# Patient Record
Sex: FEMALE | Race: ASIAN | NOT HISPANIC OR LATINO
[De-identification: names, ages, dates, MRNs, and addresses within clinical notes are randomized per-mention and may not be internally consistent; named-entity substitution may affect disease eponyms.]

---

## 2023-09-07 ENCOUNTER — APPOINTMENT (OUTPATIENT)
Dept: OTOLARYNGOLOGY | Facility: CLINIC | Age: 33
End: 2023-09-07

## 2023-09-07 PROBLEM — Z00.00 ENCOUNTER FOR PREVENTIVE HEALTH EXAMINATION: Status: ACTIVE | Noted: 2023-09-07

## 2024-05-27 ENCOUNTER — EMERGENCY (EMERGENCY)
Facility: HOSPITAL | Age: 34
LOS: 1 days | Discharge: HOME | End: 2024-05-27
Attending: STUDENT IN AN ORGANIZED HEALTH CARE EDUCATION/TRAINING PROGRAM | Admitting: STUDENT IN AN ORGANIZED HEALTH CARE EDUCATION/TRAINING PROGRAM
Payer: MEDICAID

## 2024-05-27 VITALS
OXYGEN SATURATION: 100 % | RESPIRATION RATE: 16 BRPM | SYSTOLIC BLOOD PRESSURE: 100 MMHG | HEART RATE: 82 BPM | TEMPERATURE: 98 F | DIASTOLIC BLOOD PRESSURE: 68 MMHG

## 2024-05-27 VITALS
DIASTOLIC BLOOD PRESSURE: 65 MMHG | SYSTOLIC BLOOD PRESSURE: 99 MMHG | RESPIRATION RATE: 18 BRPM | OXYGEN SATURATION: 99 % | TEMPERATURE: 98 F | HEART RATE: 80 BPM

## 2024-05-27 LAB
ALBUMIN SERPL ELPH-MCNC: 4.1 G/DL — SIGNIFICANT CHANGE UP (ref 3.3–5)
ALP SERPL-CCNC: 65 U/L — SIGNIFICANT CHANGE UP (ref 40–120)
ALT FLD-CCNC: 21 U/L — SIGNIFICANT CHANGE UP (ref 4–33)
ANION GAP SERPL CALC-SCNC: 12 MMOL/L — SIGNIFICANT CHANGE UP (ref 7–14)
AST SERPL-CCNC: 38 U/L — HIGH (ref 4–32)
BASOPHILS # BLD AUTO: 0.04 K/UL — SIGNIFICANT CHANGE UP (ref 0–0.2)
BASOPHILS NFR BLD AUTO: 0.4 % — SIGNIFICANT CHANGE UP (ref 0–2)
BILIRUB SERPL-MCNC: 0.3 MG/DL — SIGNIFICANT CHANGE UP (ref 0.2–1.2)
BUN SERPL-MCNC: 11 MG/DL — SIGNIFICANT CHANGE UP (ref 7–23)
CALCIUM SERPL-MCNC: 9.2 MG/DL — SIGNIFICANT CHANGE UP (ref 8.4–10.5)
CHLORIDE SERPL-SCNC: 105 MMOL/L — SIGNIFICANT CHANGE UP (ref 98–107)
CO2 SERPL-SCNC: 21 MMOL/L — LOW (ref 22–31)
CREAT SERPL-MCNC: 0.38 MG/DL — LOW (ref 0.5–1.3)
EGFR: 136 ML/MIN/1.73M2 — SIGNIFICANT CHANGE UP
EOSINOPHIL # BLD AUTO: 0.17 K/UL — SIGNIFICANT CHANGE UP (ref 0–0.5)
EOSINOPHIL NFR BLD AUTO: 1.6 % — SIGNIFICANT CHANGE UP (ref 0–6)
GLUCOSE SERPL-MCNC: 82 MG/DL — SIGNIFICANT CHANGE UP (ref 70–99)
HCT VFR BLD CALC: 35 % — SIGNIFICANT CHANGE UP (ref 34.5–45)
HGB BLD-MCNC: 11.9 G/DL — SIGNIFICANT CHANGE UP (ref 11.5–15.5)
IANC: 6.59 K/UL — SIGNIFICANT CHANGE UP (ref 1.8–7.4)
IMM GRANULOCYTES NFR BLD AUTO: 0.2 % — SIGNIFICANT CHANGE UP (ref 0–0.9)
LYMPHOCYTES # BLD AUTO: 2.93 K/UL — SIGNIFICANT CHANGE UP (ref 1–3.3)
LYMPHOCYTES # BLD AUTO: 28 % — SIGNIFICANT CHANGE UP (ref 13–44)
MAGNESIUM SERPL-MCNC: 2.2 MG/DL — SIGNIFICANT CHANGE UP (ref 1.6–2.6)
MCHC RBC-ENTMCNC: 28.6 PG — SIGNIFICANT CHANGE UP (ref 27–34)
MCHC RBC-ENTMCNC: 34 GM/DL — SIGNIFICANT CHANGE UP (ref 32–36)
MCV RBC AUTO: 84.1 FL — SIGNIFICANT CHANGE UP (ref 80–100)
MONOCYTES # BLD AUTO: 0.72 K/UL — SIGNIFICANT CHANGE UP (ref 0–0.9)
MONOCYTES NFR BLD AUTO: 6.9 % — SIGNIFICANT CHANGE UP (ref 2–14)
NEUTROPHILS # BLD AUTO: 6.59 K/UL — SIGNIFICANT CHANGE UP (ref 1.8–7.4)
NEUTROPHILS NFR BLD AUTO: 62.9 % — SIGNIFICANT CHANGE UP (ref 43–77)
NRBC # BLD: 0 /100 WBCS — SIGNIFICANT CHANGE UP (ref 0–0)
NRBC # FLD: 0 K/UL — SIGNIFICANT CHANGE UP (ref 0–0)
NT-PROBNP SERPL-SCNC: 84 PG/ML — SIGNIFICANT CHANGE UP
PLATELET # BLD AUTO: 147 K/UL — LOW (ref 150–400)
POTASSIUM SERPL-MCNC: 5.2 MMOL/L — SIGNIFICANT CHANGE UP (ref 3.5–5.3)
POTASSIUM SERPL-SCNC: 5.2 MMOL/L — SIGNIFICANT CHANGE UP (ref 3.5–5.3)
PROT SERPL-MCNC: 7.7 G/DL — SIGNIFICANT CHANGE UP (ref 6–8.3)
RBC # BLD: 4.16 M/UL — SIGNIFICANT CHANGE UP (ref 3.8–5.2)
RBC # FLD: 14.6 % — HIGH (ref 10.3–14.5)
SODIUM SERPL-SCNC: 138 MMOL/L — SIGNIFICANT CHANGE UP (ref 135–145)
TROPONIN T, HIGH SENSITIVITY RESULT: <6 NG/L — SIGNIFICANT CHANGE UP
WBC # BLD: 10.47 K/UL — SIGNIFICANT CHANGE UP (ref 3.8–10.5)
WBC # FLD AUTO: 10.47 K/UL — SIGNIFICANT CHANGE UP (ref 3.8–10.5)

## 2024-05-27 PROCEDURE — 99284 EMERGENCY DEPT VISIT MOD MDM: CPT

## 2024-05-27 PROCEDURE — 93010 ELECTROCARDIOGRAM REPORT: CPT

## 2024-05-27 PROCEDURE — 71045 X-RAY EXAM CHEST 1 VIEW: CPT | Mod: 26

## 2024-05-27 RX ORDER — ASPIRIN/CALCIUM CARB/MAGNESIUM 324 MG
162 TABLET ORAL ONCE
Refills: 0 | Status: COMPLETED | OUTPATIENT
Start: 2024-05-27 | End: 2024-05-27

## 2024-05-27 RX ORDER — FUROSEMIDE 40 MG
40 TABLET ORAL ONCE
Refills: 0 | Status: COMPLETED | OUTPATIENT
Start: 2024-05-27 | End: 2024-05-27

## 2024-05-27 RX ADMIN — Medication 162 MILLIGRAM(S): at 15:35

## 2024-05-27 RX ADMIN — Medication 40 MILLIGRAM(S): at 15:35

## 2024-05-27 NOTE — ED ADULT TRIAGE NOTE - LOCATION:
----- Message from Marilu Reyna sent at 11/7/2017 10:17 AM CST -----  Contact: pt      _  1st Request  _  2nd Request  _  3rd Request    Please refill the medication(s) listed below. Please call the patient when the prescription(s) is ready for  at this phone number      [phone]      Medication #1hydrOXYzine HCl (ATARAX) 25 MG tablet     Medication #2      Preferred Pharmacy:[facility] [phone]    
Right arm;

## 2024-05-27 NOTE — ED ADULT TRIAGE NOTE - CHIEF COMPLAINT QUOTE
Patient has c/o left underarm pain for the past three days radiating to her chest when she puts her am up. She also states that she feels weak and dizzy.

## 2024-05-27 NOTE — ED ADULT NURSE NOTE - OBJECTIVE STATEMENT
Pt arrives to intake. Pt is A and OX4 and ambulatory. Pt arrives to the ED complaining of left arm pain and chest pain for the past 3 days Pt states that the pain is constant. Pt states that her left breast area is also more swollen than usual. Airway is patent, respirations are even and unlabored. IV placed in the LAC. Pt medicated and labs sent per provider orders. Plan of care ongoing, safety maintained.

## 2024-05-27 NOTE — ED PROVIDER NOTE - PATIENT PORTAL LINK FT
You can access the FollowMyHealth Patient Portal offered by [facility] by registering at the following website: [email or web address]. By joining [email or web address]’s FollowMyHealth portal, you will also be able to view your health information using other applications (apps) compatible with our system.

## 2024-05-27 NOTE — ED PROVIDER NOTE - NSFOLLOWUPINSTRUCTIONS_ED_ALL_ED_FT
Chest Pain    WHAT YOU NEED TO KNOW:    Chest pain can be caused by a range of conditions, from not serious to life-threatening. Chest pain can be a symptom of a digestive problem, such as acid reflux or a stomach ulcer. An anxiety attack or a strong emotion, such as anger, can also cause chest pain. Infection, inflammation, or a fracture in the bones or cartilage in your chest can cause pain or discomfort. Sometimes chest pain or pressure is caused by poor blood flow to your heart (angina). Chest pain may also be caused by life-threatening conditions such as a heart attack or blood clot in your lungs.    DISCHARGE INSTRUCTIONS:    Call your local emergency number (911 in the [place]) or have someone call if:    You have any of the following signs of a heart attack:  Squeezing, pressure, or pain in your chest    You may also have any of the following:  Discomfort or pain in your back, neck, jaw, stomach, or arm    Shortness of breath    Nausea or vomiting    Lightheadedness or a sudden cold sweat    Seek care immediately if:    You have chest discomfort that gets worse, even with medicine.    You cough or vomit blood.    Your bowel movements are black or bloody.    You cannot stop vomiting, or it hurts to swallow.  Call your doctor if:    You have questions or concerns about your condition or care.    Medicines:    Medicines may be given to treat the cause of your chest pain. Examples include pain medicine, anxiety medicine, or medicines to increase blood flow to your heart.    Do not take certain medicines without asking your healthcare provider first. These include NSAIDs, herbal or vitamin supplements, or hormones (estrogen or progestin).    Take your medicine as directed. Contact your healthcare provider if you think your medicine is not helping or if you have side effects. Tell him or her if you are allergic to any medicine. Keep a list of the medicines, vitamins, and herbs you take. Include the amounts, and when and why you take them. Bring the list or the pill bottles to follow-up visits. Carry your medicine list with you in case of an emergency.  Healthy living tips: The following are general healthy guidelines. If the cause of your chest pain is known, your healthcare provider will give you specific guidelines to follow.    Do not smoke. Nicotine and other chemicals in cigarettes and cigars can cause lung and heart damage. Ask your healthcare provider for information if you currently smoke and need help to quit. E-cigarettes or smokeless tobacco still contain nicotine. Talk to your healthcare provider before you use these products.    Choose a variety of healthy foods as often as possible. Include fresh, frozen, or canned fruits and vegetables. Also include low-fat dairy products, fish, chicken (without skin), and lean meats. Your healthcare provider or a dietitian can help you create meal plans. You may need to avoid certain foods or drinks if your pain is caused by a digestion problem.  Healthy Foods      Lower your sodium (salt) intake. Limit foods that are high in sodium, such as canned foods, salty snacks, and cold cuts. If you add salt when you cook food, do not add more at the table. Choose low-sodium canned foods as much as possible.        Drink plenty of water every day. Water helps your body to control temperature and blood pressure. Ask your healthcare provider how much water you should drink every day.    Ask about activity. Your healthcare provider will tell you which activities to limit or avoid. Ask when you can drive, return to work, and have sex. Ask about the best exercise plan for you.    Maintain a healthy weight. Ask your healthcare provider what a healthy weight is for you. Ask him or her to help you create a safe weight loss plan if you are overweight.    Ask about vaccines you may need. Get the influenza (flu) vaccine every year as soon as recommended, usually in September or October. You may also need a pneumococcal vaccine to prevent pneumonia. The vaccine is usually given every 5 years, starting at age 65. Your healthcare provider can tell you if should get other vaccines, and when to get them.  Follow up with your healthcare provider within 72 hours, or as directed: You may need to return for more tests to find the cause of your chest pain. You may be referred to a specialist, such as a cardiologist or gastroenterologist. Write down your questions so you remember to ask them during your visits.

## 2024-05-27 NOTE — ED ADULT NURSE NOTE - NSFALLUNIVINTERV_ED_ALL_ED
Bed/Stretcher in lowest position, wheels locked, appropriate side rails in place/Call bell, personal items and telephone in reach/Instruct patient to call for assistance before getting out of bed/chair/stretcher/Non-slip footwear applied when patient is off stretcher/[place] to call system/Physically safe environment - no spills, clutter or unnecessary equipment/Purposeful proactive rounding/Room/bathroom lighting operational, light cord in reach

## 2024-05-27 NOTE — ED PROVIDER NOTE - CLINICAL SUMMARY MEDICAL DECISION MAKING FREE TEXT BOX
34-year-old female no stated past medical history is presenting to the emergency department with left-sided breast pain.  Says it is in her chest and radiates to the left arm.  It is constant.  Nonexertional.  Has seen a cardiologist years ago with a negative workup.  Patient has no shortness of breath, no fever no vomiting no diarrhea.  Vital signs reviewed and are within normal limits.  Chaperoned exam shows a normal left breast without any rash.  She does have some reproducible chest wall tenderness in the mid left thoracic chest.  Abdomen soft nontender nondistended.  Will obtain basic screening labs and ACS workup.  Patient is PERC negative.  Patient to be signed out to oncoming attending pending workup with likely eventual discharge. 34-year-old female no stated past medical history is presenting to the emergency department with left-sided breast pain.  Says it is in her chest and radiates to the left arm.  It is constant.  Nonexertional.  Has seen a cardiologist years ago with a negative workup.  Patient has no shortness of breath, no fever no vomiting no diarrhea.  Vital signs reviewed and are within normal limits.  Chaperoned exam shows a normal left breast without any rash.  She does have some reproducible chest wall tenderness in the mid left thoracic chest.  Abdomen soft nontender nondistended.  Will obtain basic screening labs and ACS workup.  Patient is PERC negative.        ===================================  update (Bart Concepcion MD; attending emergency medicine and medical toxicology)    Workup negative.  Patient wants to follow-up with her private cardiologist.  Return precautions reviewed

## 2024-05-27 NOTE — ED ADULT TRIAGE NOTE - MODE OF ARRIVAL
My top 10 tips to keep eyes healthy and comfortable:     1. Wear sunglasses with 100% UV protection and blue blocking lenses to lessen the risk or progression of cataracts, macular degeneration, and eyelid skin cancers.  2. Consider a clear 100% UV coating and an antireflective coating on prescription eyeglasses.  3. Consider impact-resistant Trivex or polycarbonate lenses for prescription eyeglasses for safety.  4. Position computer screens greater than 24 inches away and at a pitch of 20-30° below eye level. Bright room and screen lighting improves focusing ability by constricting the pupils of the eyes.  5. Avoid \"get the red out\" eyedrops (Visine, Murine, Clear Eyes, etc). Instead use an artificial tear drop (Systane Complete Lubricant Eye Drops, Systane Ultra Lubricant Eye Drops, Refresh Optive Eye Drops, GenTeal Mild Liquid Drops) for dryness and irritation or an antihistamine drop (Alaway or Zaditor (ketotifen fumarate ophthalmic solution 0.035%)) for allergy symptoms.  6. Avoid smoking - smoking increases the risk of cataracts and macular degeneration.  7. Avoid eye rubbing - rubbing increases the risk of keratoconus (a corneal degeneration).  8. Take a multivitamin and 6 mg of lutein daily (lutein is found in kale and spinach and helps strengthen the pigment layer of the retina protecting against macular degeneration and may also help with light sensitivity).  9. Proper diet and exercise to lower weight, blood pressure, cholesterol, and blood sugar which lessens the risk of retinal disorders.   10. Have a dilated eye examination as directed by our office. The frequency is based on current eye health, age, family history, and risk factors. Many eye and general health problems are symptomless and are uncovered by eye exams. Dilation is an extremely important part of the exam. Without dilation, detection of eye problems (retinal bleeds/strokes, tears, detachments, degenerations, and melanoma) and signs of an  underlying health problem (diabetes, hypertension, autoimmune disease, leukemia, lymphoma, blockage of a carotid or cardiac artery, toxoplasmosis, histoplasmosis, and many others) is impossible. If these conditions are undetected and untreated, some can lead to blindness or even death. In our office, dilation is mandatory to provide the best care possible.    Thank you for choosing and trusting our office with your health.     Walk in

## 2024-09-17 NOTE — ED PROVIDER NOTE - CHIEF COMPLAINT
The patient is a 34y Female complaining of chest pain.
Photo Preface (Leave Blank If You Do Not Want): Photographs were obtained today
Detail Level: Zone

## 2024-11-09 ENCOUNTER — EMERGENCY (EMERGENCY)
Facility: HOSPITAL | Age: 34
LOS: 1 days | Discharge: HOME | End: 2024-11-09
Attending: EMERGENCY MEDICINE | Admitting: EMERGENCY MEDICINE
Payer: MEDICAID

## 2024-11-09 VITALS
HEART RATE: 93 BPM | DIASTOLIC BLOOD PRESSURE: 69 MMHG | SYSTOLIC BLOOD PRESSURE: 106 MMHG | OXYGEN SATURATION: 99 % | TEMPERATURE: 98 F | RESPIRATION RATE: 16 BRPM | WEIGHT: 151.9 LBS

## 2024-11-09 VITALS
OXYGEN SATURATION: 100 % | HEART RATE: 79 BPM | RESPIRATION RATE: 18 BRPM | SYSTOLIC BLOOD PRESSURE: 99 MMHG | DIASTOLIC BLOOD PRESSURE: 68 MMHG

## 2024-11-09 PROCEDURE — 99284 EMERGENCY DEPT VISIT MOD MDM: CPT

## 2024-11-09 PROCEDURE — 71046 X-RAY EXAM CHEST 2 VIEWS: CPT | Mod: 26

## 2024-11-09 PROCEDURE — 93010 ELECTROCARDIOGRAM REPORT: CPT

## 2024-11-09 RX ORDER — ALBUTEROL 90 MCG
1 AEROSOL (GRAM) INHALATION ONCE
Refills: 0 | Status: COMPLETED | OUTPATIENT
Start: 2024-11-09 | End: 2024-11-09

## 2024-11-09 RX ORDER — ALBUTEROL 90 MCG
2.5 AEROSOL (GRAM) INHALATION ONCE
Refills: 0 | Status: COMPLETED | OUTPATIENT
Start: 2024-11-09 | End: 2024-11-09

## 2024-11-09 RX ADMIN — Medication 2.5 MILLIGRAM(S): at 11:54

## 2024-11-09 RX ADMIN — Medication 1 PUFF(S): at 12:33

## 2024-11-09 NOTE — ED ADULT TRIAGE NOTE - CHIEF COMPLAINT QUOTE
Pt presents to ED ambulatory from home with c/o fever x 1 week with associated nasal congestion with no improvement from over the counter medications. Pt endorses chest pain worse on deep inspiration x 2 days. Pt denies past medical hx.

## 2024-11-09 NOTE — ED ADULT NURSE NOTE - OBJECTIVE STATEMENT
A&Ox4. ambulatory. c/o SOB, nasal congestion,  and body aches for 1 weeks. NAD. pt denies chest pain, dizziness, weakness, urinary symptoms, HA, n/v/d, fevers, chills, pain. respirations are even and un labored. skin intact. safety precautions maintained.

## 2024-11-09 NOTE — ED PROVIDER NOTE - CARE PLAN
Principal Discharge DX:	Chest pain  Secondary Diagnosis:	Viral URI  Secondary Diagnosis:	RAD (reactive airway disease)   1

## 2024-11-09 NOTE — ED PROVIDER NOTE - PROGRESS NOTE DETAILS
Elle: Repeat exam after neb improved air entry but still dec. R sided b.s. CXR neg for PNA. Will give albuterol MDI w spacer and RX for prednisone course.

## 2024-11-09 NOTE — ED PROVIDER NOTE - SECONDARY DIAGNOSIS.
Yes.  Also could be related to her recent issues with diarrhea as well. MAT   Viral URI RAD (reactive airway disease)

## 2024-11-09 NOTE — ED PROVIDER NOTE - PATIENT PORTAL LINK FT
You can access the FollowMyHealth Patient Portal offered by [facility] by registering at the following website: [email or web address]. By joining [employer]’s FollowMyHealth portal, you will also be able to view your health information using other applications (apps) compatible with our system.

## 2024-11-09 NOTE — ED PROVIDER NOTE - OBJECTIVE STATEMENT
34F complaining of chest pain.  Patient has had 1 to 2 weeks of URI symptoms, initially with fever, cough, phlegm.  Patient had sore throat which lasted for several days but is now resolved.  This week patient continued to have dry cough especially at night.  For past 3 days has increased chest tightness and heaviness, increased SOB with activity, and pleuritic chest pain.  No leg complaints, no travel/trauma/hormone use.  Patient improved during daytime activity but feels he is worse at bedtime.  No history of fall or injury.  States had pneumonia as a child and required hospitalization.  Was given inhalers in the past for similar shortness of breath but not for many years.  No associated N/V/diaphoresis.

## 2024-11-09 NOTE — ED PROVIDER NOTE - CLINICAL SUMMARY MEDICAL DECISION MAKING FREE TEXT BOX
34F complaining of pleuritic chest pain associated with cough and SOB, worse in the past 3 days following 1 to 2-week history of URI symptoms and coughing.  Shallow respiration in ED reproducing pain and causing cough with deep breath.  No wheezing, diminished breath sounds on right side.  Chest x-ray, rule out pneumonia.  Trial of albuterol.  Possible antibiotics if has pneumonia but likely educate to albuterol with spacer.

## 2024-11-09 NOTE — ED PROVIDER NOTE - NSFOLLOWUPINSTRUCTIONS_ED_ALL_ED_FT
You were evaluated in the emergency department for chest pain and shortness of breath associated with cough and recent illness.  Your exam and chest x-ray did not suggest a dangerous cause of your symptoms.  You likely have a viral upper respiratory infection.  This may be causing worsening chest symptoms and shortness of breath with reactive airway disease.  This can be treated with albuterol.  Albuterol 2 puffs, every 4 hours as needed for cough, chest pain, or shortness of breath.  Use spacer as directed.  Prednisone 50 mg once daily for 5 days.  You can continue to use cough medication containing dextromethorphan.  Follow-up with your regular medical doctor.  Return to emergency for worsening pain, fever, weakness, numbness, shortness of breath, nausea, vomiting, dizziness, fainting or any signs of distress.

## 2024-11-14 ENCOUNTER — EMERGENCY (EMERGENCY)
Facility: HOSPITAL | Age: 34
LOS: 1 days | Discharge: HOME | End: 2024-11-14
Admitting: STUDENT IN AN ORGANIZED HEALTH CARE EDUCATION/TRAINING PROGRAM
Payer: MEDICAID

## 2024-11-14 VITALS
OXYGEN SATURATION: 100 % | TEMPERATURE: 99 F | SYSTOLIC BLOOD PRESSURE: 96 MMHG | WEIGHT: 149.91 LBS | DIASTOLIC BLOOD PRESSURE: 69 MMHG | HEART RATE: 74 BPM | RESPIRATION RATE: 18 BRPM

## 2024-11-14 LAB
FLUAV AG NPH QL: SIGNIFICANT CHANGE UP
FLUBV AG NPH QL: SIGNIFICANT CHANGE UP
RSV RNA NPH QL NAA+NON-PROBE: SIGNIFICANT CHANGE UP
SARS-COV-2 RNA SPEC QL NAA+PROBE: SIGNIFICANT CHANGE UP

## 2024-11-14 PROCEDURE — 99284 EMERGENCY DEPT VISIT MOD MDM: CPT

## 2024-11-14 RX ORDER — IBUPROFEN 200 MG
400 TABLET ORAL ONCE
Refills: 0 | Status: COMPLETED | OUTPATIENT
Start: 2024-11-14 | End: 2024-11-14

## 2024-11-14 RX ORDER — AMOXICILLIN AND CLAVULANATE POTASSIUM 600; 42.9 MG/5ML; MG/5ML
1 POWDER, FOR SUSPENSION ORAL ONCE
Refills: 0 | Status: COMPLETED | OUTPATIENT
Start: 2024-11-14 | End: 2024-11-14

## 2024-11-14 RX ORDER — PSEUDOEPHEDRINE HCL 30 MG
30 TABLET ORAL ONCE
Refills: 0 | Status: COMPLETED | OUTPATIENT
Start: 2024-11-14 | End: 2024-11-14

## 2024-11-14 RX ORDER — AMOXICILLIN AND CLAVULANATE POTASSIUM 600; 42.9 MG/5ML; MG/5ML
875 POWDER, FOR SUSPENSION ORAL
Qty: 14 | Refills: 0
Start: 2024-11-14 | End: 2024-11-20

## 2024-11-14 RX ADMIN — Medication 30 MILLIGRAM(S): at 20:31

## 2024-11-14 RX ADMIN — Medication 400 MILLIGRAM(S): at 20:31

## 2024-11-14 RX ADMIN — AMOXICILLIN AND CLAVULANATE POTASSIUM 1 TABLET(S): 600; 42.9 POWDER, FOR SUSPENSION ORAL at 20:45

## 2024-11-14 NOTE — ED PROVIDER NOTE - NSFOLLOWUPINSTRUCTIONS_ED_ALL_ED_FT
You were seen in the emergency room. You were diagnosed with sinusitis. You were prescribed Augmentin antibiotic please take one tablet by mouth twice daily (morning and night) with food for 7 days. Continue Naproxen and Tylenol as directed as needed. Return to ER if any new or worsening symptoms.    Sinusitis    WHAT YOU NEED TO KNOW:    Sinusitis is inflammation or infection of your sinuses. Sinusitis is most often caused by a virus. Acute sinusitis may last up to 12 weeks. Chronic sinusitis lasts longer than 12 weeks. Recurrent sinusitis means you have 4 or more infections in 1 year.  Sinuses    DISCHARGE INSTRUCTIONS:    Return to the emergency department if:    You have trouble breathing or wheezing that is getting worse.    You have a stiff neck, a fever, or a bad headache.    You cannot open your eye.    Your eyeball bulges out or you cannot move your eye.    You are more sleepy than normal, or you notice changes in your ability to think, move, or talk.    You have swelling of your forehead or scalp.  Call your doctor if:    You have vision changes, such as double vision.    Your eye and eyelid are red, swollen, and painful.    Your symptoms do not improve or go away after 10 days.    You have nausea and are vomiting.    Your nose is bleeding.    You have questions or concerns about your condition or care.  Medicines: Your symptoms may go away on their own. Your healthcare provider may recommend watchful waiting for up to 10 days before starting antibiotics. You may need any of the following:    Acetaminophen decreases pain and fever. It is available without a doctor's order. Ask how much to take and how often to take it. Follow directions. Read the labels of all other medicines you are using to see if they also contain acetaminophen, or ask your doctor or pharmacist. Acetaminophen can cause liver damage if not taken correctly.    NSAIDs, such as ibuprofen, help decrease swelling, pain, and fever. This medicine is available with or without a doctor's order. NSAIDs can cause stomach bleeding or kidney problems in certain people. If you take blood thinner medicine, always ask your healthcare provider if NSAIDs are safe for you. Always read the medicine label and follow directions.    Nasal steroid sprays may help decrease inflammation in your nose and sinuses.    Decongestants help reduce swelling and drain mucus in the nose and sinuses. They may help you breathe easier.    Antihistamines help dry mucus in the nose and relieve sneezing.    Antibiotics help treat or prevent a bacterial infection.    Take your medicine as directed. Contact your healthcare provider if you think your medicine is not helping or if you have side effects. Tell your provider if you are allergic to any medicine. Keep a list of the medicines, vitamins, and herbs you take. Include the amounts, and when and why you take them. Bring the list or the pill bottles to follow-up visits. Carry your medicine list with you in case of an emergency.  Self-care:    Rinse your sinuses as directed. Use a sinus rinse device to rinse your nasal passages with a saline (salt water) solution or distilled water. Do not use tap water. This will help thin the mucus in your nose and rinse away pollen and dirt. It will also help reduce swelling so you can breathe normally.    Use a humidifier to increase air moisture in your home. This may make it easier for you to breathe and help decrease your cough.  Humidifier      Sleep with your head elevated. Place an extra pillow under your head before you go to sleep to help your sinuses drain.  Elevate Head (Adult)      Drink liquids as directed. Ask your healthcare provider how much liquid to drink each day and which liquids are best for you. Liquids will thin the mucus in your nose and help it drain. Avoid drinks that contain alcohol or caffeine.    Do not smoke, and avoid secondhand smoke. Nicotine and other chemicals in cigarettes and cigars can make your symptoms worse. Ask your healthcare provider for information if you currently smoke and need help to quit. E-cigarettes or smokeless tobacco still contain nicotine. Talk to your healthcare provider before you use these products.  Prevent the spread of germs:    Wash your hands often with soap and water. Wash your hands after you use the bathroom, change a child's diaper, or sneeze. Wash your hands before you prepare or eat food.  Handwashing      Stay away from people who are sick. Some germs spread easily and quickly through contact.  Follow up with your doctor as directed: You may be referred to an ear, nose, and throat specialist. Write down your questions so you remember to ask them during your visits.

## 2024-11-14 NOTE — ED PROVIDER NOTE - PATIENT PORTAL LINK FT
You can access the FollowMyHealth Patient Portal offered by [facility] by registering at the following website: [email or web address]. By joining [employer]’s FollowMyHealth portal, you will also be able to view your health information using other applications (apps) compatible with our system.

## 2024-11-14 NOTE — ED ADULT NURSE NOTE - OBJECTIVE STATEMENT
Pt received to intake room 5, A&Ox4, ambulatory. Pt presenting with flu like symptoms for the last 3 days. Pt endorsing sinus congestion and pain with headache. Pt denies c/p, SOB, abd pain, n/v/d, or blurry vision. Pt medicated as per MD orders. Respirations even and unlabored, NAD noted. Lab drawn and sent. Comfort measures provided, safety maintained.

## 2024-11-14 NOTE — ED PROVIDER NOTE - OBJECTIVE STATEMENT
Pt is a 33 YO F with no significant PMH who presented to ED with 3 days of headache, sinus pressure and pain to her left upper teeth. Pt reports she has had congestion, chills, fatigue for ~ 1.5 weeks now. Pt reports she has been taking Tylenol and Naproxen every few hours with some relief. Pt otherwise denies chest pain, palpitations, nausea, vomiting, syncope, rash, neck pain, vision changes.

## 2024-11-14 NOTE — ED PROVIDER NOTE - CLINICAL SUMMARY MEDICAL DECISION MAKING FREE TEXT BOX
Pt is a 33 YO F with no significant PMH who presented to ED with 3 days of headache, sinus pressure and pain to her left upper teeth. Pt exam consistent with sinusitis. A febrile, non meningitic, lungs CTA bilaterally.  Offered pt blood work, IV fluids, IV medications however pt prefers oral medication and does not want to be stuck with a needle. plan for viral swab and will tx with antibiotics for sinusitis, strict return precautions discussed.

## 2024-11-14 NOTE — ED ADULT NURSE NOTE - NSFALLUNIVINTERV_ED_ALL_ED
Bed/Stretcher in lowest position, wheels locked, appropriate side rails in place/Call bell, personal items and telephone in reach/Instruct patient to call for assistance before getting out of bed/chair/stretcher/Non-slip footwear applied when patient is off stretcher/[place] to call system/Physically safe environment - no spills, clutter or unnecessary equipment/Purposeful proactive rounding/Room/bathroom lighting operational, light cord in reach

## 2024-11-15 PROBLEM — Z78.9 OTHER SPECIFIED HEALTH STATUS: Chronic | Status: ACTIVE | Noted: 2024-11-09

## 2024-11-19 NOTE — ED ADULT NURSE NOTE - NSFALLRISKASMTTYPE_ED_ALL_ED
Lab Results   Component Value Date    HGBA1C 9.9 (H) 11/10/2024       Recent Labs     11/18/24  1530 11/18/24  2052 11/19/24  0752 11/19/24  1110   POCGLU 155* 226* 136 140     Patient on lantus 5 U BID at home   Cw lantus BID, SSI , accuchecks titrate as needed   Lantus 12 U, BID  Lispro 8 U, TID w meals  ISS  Mgmt per IM    Initial (On Arrival)

## 2025-01-30 ENCOUNTER — EMERGENCY (EMERGENCY)
Facility: HOSPITAL | Age: 35
LOS: 1 days | Discharge: HOME | End: 2025-01-30
Attending: EMERGENCY MEDICINE | Admitting: EMERGENCY MEDICINE
Payer: MEDICAID

## 2025-01-30 VITALS
OXYGEN SATURATION: 100 % | RESPIRATION RATE: 18 BRPM | DIASTOLIC BLOOD PRESSURE: 54 MMHG | HEIGHT: 64 IN | HEART RATE: 82 BPM | TEMPERATURE: 98 F | SYSTOLIC BLOOD PRESSURE: 98 MMHG | WEIGHT: 134.92 LBS

## 2025-01-30 VITALS — SYSTOLIC BLOOD PRESSURE: 101 MMHG | HEART RATE: 73 BPM | DIASTOLIC BLOOD PRESSURE: 72 MMHG

## 2025-01-30 LAB
ALBUMIN SERPL ELPH-MCNC: 4 G/DL — SIGNIFICANT CHANGE UP (ref 3.3–5)
ALP SERPL-CCNC: 62 U/L — SIGNIFICANT CHANGE UP (ref 40–120)
ALT FLD-CCNC: 11 U/L — SIGNIFICANT CHANGE UP (ref 4–33)
ANION GAP SERPL CALC-SCNC: 13 MMOL/L — SIGNIFICANT CHANGE UP (ref 7–14)
AST SERPL-CCNC: 20 U/L — SIGNIFICANT CHANGE UP (ref 4–32)
BASOPHILS # BLD AUTO: 0.04 K/UL — SIGNIFICANT CHANGE UP (ref 0–0.2)
BASOPHILS NFR BLD AUTO: 0.4 % — SIGNIFICANT CHANGE UP (ref 0–2)
BILIRUB SERPL-MCNC: 0.3 MG/DL — SIGNIFICANT CHANGE UP (ref 0.2–1.2)
BUN SERPL-MCNC: 11 MG/DL — SIGNIFICANT CHANGE UP (ref 7–23)
CALCIUM SERPL-MCNC: 9.5 MG/DL — SIGNIFICANT CHANGE UP (ref 8.4–10.5)
CHLORIDE SERPL-SCNC: 104 MMOL/L — SIGNIFICANT CHANGE UP (ref 98–107)
CO2 SERPL-SCNC: 21 MMOL/L — LOW (ref 22–31)
CREAT SERPL-MCNC: 0.33 MG/DL — LOW (ref 0.5–1.3)
EGFR: 139 ML/MIN/1.73M2 — SIGNIFICANT CHANGE UP
EGFR: 139 ML/MIN/1.73M2 — SIGNIFICANT CHANGE UP
EOSINOPHIL # BLD AUTO: 0.1 K/UL — SIGNIFICANT CHANGE UP (ref 0–0.5)
EOSINOPHIL NFR BLD AUTO: 1 % — SIGNIFICANT CHANGE UP (ref 0–6)
FLUAV AG NPH QL: SIGNIFICANT CHANGE UP
FLUBV AG NPH QL: SIGNIFICANT CHANGE UP
GLUCOSE SERPL-MCNC: 87 MG/DL — SIGNIFICANT CHANGE UP (ref 70–99)
HCG SERPL-ACNC: <1 MIU/ML — SIGNIFICANT CHANGE UP
HCT VFR BLD CALC: 35.3 % — SIGNIFICANT CHANGE UP (ref 34.5–45)
HETEROPH AB TITR SER AGGL: NEGATIVE — SIGNIFICANT CHANGE UP
HGB BLD-MCNC: 11.3 G/DL — LOW (ref 11.5–15.5)
IANC: 7.84 K/UL — HIGH (ref 1.8–7.4)
IMM GRANULOCYTES NFR BLD AUTO: 0.3 % — SIGNIFICANT CHANGE UP (ref 0–0.9)
LYMPHOCYTES # BLD AUTO: 1.69 K/UL — SIGNIFICANT CHANGE UP (ref 1–3.3)
LYMPHOCYTES # BLD AUTO: 16.1 % — SIGNIFICANT CHANGE UP (ref 13–44)
MCHC RBC-ENTMCNC: 28.8 PG — SIGNIFICANT CHANGE UP (ref 27–34)
MCHC RBC-ENTMCNC: 32 G/DL — SIGNIFICANT CHANGE UP (ref 32–36)
MCV RBC AUTO: 90.1 FL — SIGNIFICANT CHANGE UP (ref 80–100)
MONOCYTES # BLD AUTO: 0.79 K/UL — SIGNIFICANT CHANGE UP (ref 0–0.9)
MONOCYTES NFR BLD AUTO: 7.5 % — SIGNIFICANT CHANGE UP (ref 2–14)
NEUTROPHILS # BLD AUTO: 7.84 K/UL — HIGH (ref 1.8–7.4)
NEUTROPHILS NFR BLD AUTO: 74.7 % — SIGNIFICANT CHANGE UP (ref 43–77)
NRBC # BLD AUTO: 0 K/UL — SIGNIFICANT CHANGE UP (ref 0–0)
NRBC # BLD: 0 /100 WBCS — SIGNIFICANT CHANGE UP (ref 0–0)
NRBC # FLD: 0 K/UL — SIGNIFICANT CHANGE UP (ref 0–0)
NRBC BLD-RTO: 0 /100 WBCS — SIGNIFICANT CHANGE UP (ref 0–0)
PLATELET # BLD AUTO: 99 K/UL — LOW (ref 150–400)
POTASSIUM SERPL-MCNC: 4.1 MMOL/L — SIGNIFICANT CHANGE UP (ref 3.5–5.3)
POTASSIUM SERPL-SCNC: 4.1 MMOL/L — SIGNIFICANT CHANGE UP (ref 3.5–5.3)
PROT SERPL-MCNC: 7.4 G/DL — SIGNIFICANT CHANGE UP (ref 6–8.3)
RBC # BLD: 3.92 M/UL — SIGNIFICANT CHANGE UP (ref 3.8–5.2)
RBC # FLD: 13.2 % — SIGNIFICANT CHANGE UP (ref 10.3–14.5)
RSV RNA NPH QL NAA+NON-PROBE: SIGNIFICANT CHANGE UP
SARS-COV-2 RNA SPEC QL NAA+PROBE: SIGNIFICANT CHANGE UP
SODIUM SERPL-SCNC: 138 MMOL/L — SIGNIFICANT CHANGE UP (ref 135–145)
WBC # BLD: 10.49 K/UL — SIGNIFICANT CHANGE UP (ref 3.8–10.5)
WBC # FLD AUTO: 10.49 K/UL — SIGNIFICANT CHANGE UP (ref 3.8–10.5)

## 2025-01-30 PROCEDURE — 99284 EMERGENCY DEPT VISIT MOD MDM: CPT

## 2025-01-30 PROCEDURE — 93010 ELECTROCARDIOGRAM REPORT: CPT

## 2025-01-30 RX ORDER — IBUPROFEN 200 MG
400 TABLET ORAL ONCE
Refills: 0 | Status: DISCONTINUED | OUTPATIENT
Start: 2025-01-30 | End: 2025-01-30

## 2025-01-30 RX ORDER — DEXAMETHASONE 0.5 MG/1
8 TABLET ORAL ONCE
Refills: 0 | Status: DISCONTINUED | OUTPATIENT
Start: 2025-01-30 | End: 2025-01-30

## 2025-01-30 RX ORDER — AMOXICILLIN 500 MG/1
500 CAPSULE ORAL ONCE
Refills: 0 | Status: COMPLETED | OUTPATIENT
Start: 2025-01-30 | End: 2025-01-30

## 2025-01-30 RX ORDER — AMOXICILLIN 500 MG/1
500 CAPSULE ORAL ONCE
Refills: 0 | Status: DISCONTINUED | OUTPATIENT
Start: 2025-01-30 | End: 2025-01-30

## 2025-01-30 RX ORDER — DEXAMETHASONE 0.5 MG/1
10 TABLET ORAL ONCE
Refills: 0 | Status: COMPLETED | OUTPATIENT
Start: 2025-01-30 | End: 2025-01-30

## 2025-01-30 RX ORDER — KETOROLAC TROMETHAMINE 30 MG/ML
15 INJECTION, SOLUTION INTRAMUSCULAR; INTRAVENOUS ONCE
Refills: 0 | Status: DISCONTINUED | OUTPATIENT
Start: 2025-01-30 | End: 2025-01-30

## 2025-01-30 RX ORDER — AMOXICILLIN 500 MG/1
1 CAPSULE ORAL
Qty: 19 | Refills: 0
Start: 2025-01-30 | End: 2025-02-08

## 2025-01-30 RX ADMIN — AMOXICILLIN 500 MILLIGRAM(S): 500 CAPSULE ORAL at 11:22

## 2025-01-30 RX ADMIN — Medication 2000 MILLILITER(S): at 11:20

## 2025-01-30 RX ADMIN — KETOROLAC TROMETHAMINE 15 MILLIGRAM(S): 30 INJECTION, SOLUTION INTRAMUSCULAR; INTRAVENOUS at 11:32

## 2025-01-30 RX ADMIN — DEXAMETHASONE 102 MILLIGRAM(S): 0.5 TABLET ORAL at 11:32

## 2025-01-31 LAB
CULTURE RESULTS: ABNORMAL
SPECIMEN SOURCE: SIGNIFICANT CHANGE UP

## 2025-03-06 ENCOUNTER — INPATIENT (INPATIENT)
Facility: HOSPITAL | Age: 35
LOS: 2 days | Discharge: HOME | End: 2025-03-09
Attending: STUDENT IN AN ORGANIZED HEALTH CARE EDUCATION/TRAINING PROGRAM | Admitting: STUDENT IN AN ORGANIZED HEALTH CARE EDUCATION/TRAINING PROGRAM
Payer: MEDICAID

## 2025-03-06 VITALS
DIASTOLIC BLOOD PRESSURE: 75 MMHG | OXYGEN SATURATION: 100 % | HEIGHT: 64 IN | TEMPERATURE: 98 F | SYSTOLIC BLOOD PRESSURE: 107 MMHG | RESPIRATION RATE: 18 BRPM | WEIGHT: 138.01 LBS | HEART RATE: 93 BPM

## 2025-03-06 LAB
A1C WITH ESTIMATED AVERAGE GLUCOSE RESULT: 4.9 % — SIGNIFICANT CHANGE UP (ref 4–5.6)
ADD ON TEST-SPECIMEN IN LAB: SIGNIFICANT CHANGE UP
ALBUMIN SERPL ELPH-MCNC: 4.3 G/DL — SIGNIFICANT CHANGE UP (ref 3.3–5)
ALP SERPL-CCNC: 59 U/L — SIGNIFICANT CHANGE UP (ref 40–120)
ALT FLD-CCNC: 15 U/L — SIGNIFICANT CHANGE UP (ref 4–33)
ANION GAP SERPL CALC-SCNC: 10 MMOL/L — SIGNIFICANT CHANGE UP (ref 7–14)
ANISOCYTOSIS BLD QL: SLIGHT — SIGNIFICANT CHANGE UP
APTT BLD: 36.6 SEC — HIGH (ref 24.5–35.6)
AST SERPL-CCNC: 39 U/L — HIGH (ref 4–32)
BASOPHILS # BLD AUTO: 0.09 K/UL — SIGNIFICANT CHANGE UP (ref 0–0.2)
BASOPHILS NFR BLD AUTO: 0.9 % — SIGNIFICANT CHANGE UP (ref 0–2)
BILIRUB SERPL-MCNC: 0.3 MG/DL — SIGNIFICANT CHANGE UP (ref 0.2–1.2)
BUN SERPL-MCNC: 13 MG/DL — SIGNIFICANT CHANGE UP (ref 7–23)
CALCIUM SERPL-MCNC: 9.3 MG/DL — SIGNIFICANT CHANGE UP (ref 8.4–10.5)
CHLORIDE SERPL-SCNC: 103 MMOL/L — SIGNIFICANT CHANGE UP (ref 98–107)
CO2 SERPL-SCNC: 22 MMOL/L — SIGNIFICANT CHANGE UP (ref 22–31)
CREAT SERPL-MCNC: 0.38 MG/DL — LOW (ref 0.5–1.3)
D DIMER BLD IA.RAPID-MCNC: <150 NG/ML DDU — SIGNIFICANT CHANGE UP
DACRYOCYTES BLD QL SMEAR: SLIGHT — SIGNIFICANT CHANGE UP
EGFR: 135 ML/MIN/1.73M2 — SIGNIFICANT CHANGE UP
EGFR: 135 ML/MIN/1.73M2 — SIGNIFICANT CHANGE UP
EOSINOPHIL # BLD AUTO: 0.09 K/UL — SIGNIFICANT CHANGE UP (ref 0–0.5)
EOSINOPHIL NFR BLD AUTO: 0.9 % — SIGNIFICANT CHANGE UP (ref 0–6)
ESTIMATED AVERAGE GLUCOSE: 94 — SIGNIFICANT CHANGE UP
GIANT PLATELETS BLD QL SMEAR: PRESENT — SIGNIFICANT CHANGE UP
GLUCOSE SERPL-MCNC: 86 MG/DL — SIGNIFICANT CHANGE UP (ref 70–99)
HCG SERPL-ACNC: <1 MIU/ML — SIGNIFICANT CHANGE UP
HCT VFR BLD CALC: 38.1 % — SIGNIFICANT CHANGE UP (ref 34.5–45)
HGB BLD-MCNC: 12.7 G/DL — SIGNIFICANT CHANGE UP (ref 11.5–15.5)
IANC: 7.22 K/UL — SIGNIFICANT CHANGE UP (ref 1.8–7.4)
INR BLD: 0.92 RATIO — SIGNIFICANT CHANGE UP (ref 0.85–1.16)
LIDOCAIN IGE QN: 38 U/L — SIGNIFICANT CHANGE UP (ref 7–60)
LYMPHOCYTES # BLD AUTO: 2.12 K/UL — SIGNIFICANT CHANGE UP (ref 1–3.3)
LYMPHOCYTES # BLD AUTO: 20.5 % — SIGNIFICANT CHANGE UP (ref 13–44)
MANUAL SMEAR VERIFICATION: SIGNIFICANT CHANGE UP
MCHC RBC-ENTMCNC: 29.3 PG — SIGNIFICANT CHANGE UP (ref 27–34)
MCHC RBC-ENTMCNC: 33.3 G/DL — SIGNIFICANT CHANGE UP (ref 32–36)
MCV RBC AUTO: 87.8 FL — SIGNIFICANT CHANGE UP (ref 80–100)
MONOCYTES # BLD AUTO: 0.65 K/UL — SIGNIFICANT CHANGE UP (ref 0–0.9)
MONOCYTES NFR BLD AUTO: 6.3 % — SIGNIFICANT CHANGE UP (ref 2–14)
NEUTROPHILS # BLD AUTO: 6.19 K/UL — SIGNIFICANT CHANGE UP (ref 1.8–7.4)
NEUTROPHILS NFR BLD AUTO: 58 % — SIGNIFICANT CHANGE UP (ref 43–77)
NEUTS BAND # BLD: 1.8 % — SIGNIFICANT CHANGE UP (ref 0–6)
NEUTS BAND NFR BLD: 1.8 % — SIGNIFICANT CHANGE UP (ref 0–6)
PLAT MORPH BLD: ABNORMAL
PLATELET # BLD AUTO: 100 K/UL — LOW (ref 150–400)
PLATELET COUNT - ESTIMATE: ABNORMAL
POIKILOCYTOSIS BLD QL AUTO: SLIGHT — SIGNIFICANT CHANGE UP
POTASSIUM SERPL-MCNC: 5.3 MMOL/L — SIGNIFICANT CHANGE UP (ref 3.5–5.3)
POTASSIUM SERPL-SCNC: 5.3 MMOL/L — SIGNIFICANT CHANGE UP (ref 3.5–5.3)
PROT SERPL-MCNC: 8.5 G/DL — HIGH (ref 6–8.3)
PROTHROM AB SERPL-ACNC: 10.7 SEC — SIGNIFICANT CHANGE UP (ref 9.9–13.4)
RBC # BLD: 4.34 M/UL — SIGNIFICANT CHANGE UP (ref 3.8–5.2)
RBC # FLD: 13.2 % — SIGNIFICANT CHANGE UP (ref 10.3–14.5)
RBC BLD AUTO: SIGNIFICANT CHANGE UP
SMUDGE CELLS # BLD: PRESENT — SIGNIFICANT CHANGE UP
SODIUM SERPL-SCNC: 135 MMOL/L — SIGNIFICANT CHANGE UP (ref 135–145)
STOMATOCYTES BLD QL SMEAR: SLIGHT — SIGNIFICANT CHANGE UP
TROPONIN T, HIGH SENSITIVITY RESULT: <6 NG/L — SIGNIFICANT CHANGE UP
TROPONIN T, HIGH SENSITIVITY RESULT: <6 NG/L — SIGNIFICANT CHANGE UP
VARIANT LYMPHS # BLD: 11.6 % — HIGH (ref 0–6)
VARIANT LYMPHS NFR BLD MANUAL: 11.6 % — HIGH (ref 0–6)
WBC # BLD: 10.35 K/UL — SIGNIFICANT CHANGE UP (ref 3.8–10.5)
WBC # FLD AUTO: 10.35 K/UL — SIGNIFICANT CHANGE UP (ref 3.8–10.5)

## 2025-03-06 PROCEDURE — 99223 1ST HOSP IP/OBS HIGH 75: CPT

## 2025-03-06 PROCEDURE — 71046 X-RAY EXAM CHEST 2 VIEWS: CPT | Mod: 26

## 2025-03-06 RX ORDER — ACETAMINOPHEN 500 MG/5ML
1000 LIQUID (ML) ORAL ONCE
Refills: 0 | Status: COMPLETED | OUTPATIENT
Start: 2025-03-06 | End: 2025-03-06

## 2025-03-06 RX ORDER — KETOROLAC TROMETHAMINE 30 MG/ML
30 INJECTION, SOLUTION INTRAMUSCULAR; INTRAVENOUS ONCE
Refills: 0 | Status: DISCONTINUED | OUTPATIENT
Start: 2025-03-06 | End: 2025-03-06

## 2025-03-06 RX ORDER — OXYCODONE HYDROCHLORIDE AND ACETAMINOPHEN 10; 325 MG/1; MG/1
1 TABLET ORAL ONCE
Refills: 0 | Status: DISCONTINUED | OUTPATIENT
Start: 2025-03-06 | End: 2025-03-06

## 2025-03-06 RX ORDER — ACETAMINOPHEN 500 MG/5ML
975 LIQUID (ML) ORAL ONCE
Refills: 0 | Status: COMPLETED | OUTPATIENT
Start: 2025-03-06 | End: 2025-03-06

## 2025-03-06 RX ORDER — KETOROLAC TROMETHAMINE 30 MG/ML
30 INJECTION, SOLUTION INTRAMUSCULAR; INTRAVENOUS EVERY 6 HOURS
Refills: 0 | Status: DISCONTINUED | OUTPATIENT
Start: 2025-03-06 | End: 2025-03-07

## 2025-03-06 RX ADMIN — KETOROLAC TROMETHAMINE 30 MILLIGRAM(S): 30 INJECTION, SOLUTION INTRAMUSCULAR; INTRAVENOUS at 12:18

## 2025-03-06 RX ADMIN — OXYCODONE HYDROCHLORIDE AND ACETAMINOPHEN 1 TABLET(S): 10; 325 TABLET ORAL at 22:10

## 2025-03-06 RX ADMIN — Medication 400 MILLIGRAM(S): at 10:51

## 2025-03-06 RX ADMIN — Medication 975 MILLIGRAM(S): at 17:33

## 2025-03-06 RX ADMIN — OXYCODONE HYDROCHLORIDE AND ACETAMINOPHEN 1 TABLET(S): 10; 325 TABLET ORAL at 23:10

## 2025-03-06 RX ADMIN — KETOROLAC TROMETHAMINE 30 MILLIGRAM(S): 30 INJECTION, SOLUTION INTRAMUSCULAR; INTRAVENOUS at 19:45

## 2025-03-06 RX ADMIN — Medication 975 MILLIGRAM(S): at 18:33

## 2025-03-06 NOTE — ED PROVIDER NOTE - NSFOLLOWUPINSTRUCTIONS_ED_ALL_ED_FT
[facility] General Internal Medicine  General Internal Medicine  [address] [place] [address]  Phone: [phone]  Fax:     [facility]  Internal Medicine  [address] [place] [address]  Phone: [phone]  Fax: [phone]    [facility]  Cardiology  [address] [place] [address]  Phone: [phone]    Chest Pain  WHAT YOU NEED TO KNOW:  Chest pain can be caused by a range of conditions, from not serious to life-threatening. Chest pain can be a symptom of a digestive problem, such as acid reflux or a stomach ulcer. An anxiety attack or a strong emotion, such as anger, can also cause chest pain. Infection, inflammation, or a fracture in the bones or cartilage in your chest can cause pain or discomfort. Sometimes chest pain or pressure is caused by poor blood flow to your heart (angina). Chest pain may also be caused by life-threatening conditions such as a heart attack or blood clot in your lungs.   DISCHARGE INSTRUCTIONS:  Call 911 if:   •You have any of the following signs of a heart attack: ?Squeezing, pressure, or pain in your chest  ?You may also have any of the following: ?Discomfort or pain in your back, neck, jaw, stomach, or arm  ?Shortness of breath  ?Nausea or vomiting  ?Lightheadedness or a sudden cold sweat  Seek care immediately if:   •You have chest discomfort that gets worse, even with medicine.  •You cough or vomit blood.   •Your bowel movements are black or bloody.   •You cannot stop vomiting, or it hurts to swallow.   Contact your healthcare provider if:   •You have questions or concerns about your condition or care.  Medicines:   •Medicines may be given to treat the cause of your chest pain. Examples include pain medicine, anxiety medicine, or medicines to increase blood flow to your heart.   •Do not take certain medicines without asking your healthcare provider first. These include NSAIDs, herbal or vitamin supplements, or hormones (estrogen or progestin).   •Take your medicine as directed. Contact your healthcare provider if you think your medicine is not helping or if you have side effects. Tell him or her if you are allergic to any medicine. Keep a list of the medicines, vitamins, and herbs you take. Include the amounts, and when and why you take them. Bring the list or the pill bottles to follow-up visits. Carry your medicine list with you in case of an emergency.  Follow up with your healthcare provider within 72 hours, or as directed: You may need to return for more tests to find the cause of your chest pain. You may be referred to a specialist, such as a cardiologist or gastroenterologist. Write down your questions so you remember to ask them during your visits.   Healthy living tips: The following are general healthy guidelines. If your chest pain is caused by a heart problem, your healthcare provider will give you specific guidelines to follow.  •Do not smoke. Nicotine and other chemicals in cigarettes and cigars can cause lung and heart damage. Ask your healthcare provider for information if you currently smoke and need help to quit. E-cigarettes or smokeless tobacco still contain nicotine. Talk to your healthcare provider before you use these products.   •Eat a variety of healthy, low-fat, low-salt foods. Healthy foods include fruits, vegetables, whole-grain breads, low-fat dairy products, beans, lean meats, and fish. Ask for more information about a heart healthy diet.  •Drink plenty of water every day. Your body is made of mostly water. Water helps your body to control temperature and blood pressure. Ask your healthcare provider how much water you should drink every day.  •Ask about activity. Your healthcare provider will tell you which activities to limit or avoid. Ask when you can drive, return to work, and have sex. Ask about the best exercise plan for you.  •Maintain a healthy weight. Ask your healthcare provider how much you should weigh. Ask him or her to help you create a weight loss plan if you are overweight.   If you have a stent:   •Carry your stent card with you at all times.   •Let all healthcare providers know that you have a stent.     [facility] Internal Medicine  General Internal Medicine  [address] [place] [address]  Phone: [phone]  Fax:     [facility]  Internal Medicine  [address] [place] [address]  Phone: [phone]  Fax: [phone]    [facility]  Cardiology  [address] [place] [address]  Phone: [phone]    Chest Pain  WHAT YOU NEED TO KNOW:  Chest pain can be caused by a range of conditions, from not serious to life-threatening. Chest pain can be a symptom of a digestive problem, such as acid reflux or a stomach ulcer. An anxiety attack or a strong emotion, such as anger, can also cause chest pain. Infection, inflammation, or a fracture in the bones or cartilage in your chest can cause pain or discomfort. Sometimes chest pain or pressure is caused by poor blood flow to your heart (angina). Chest pain may also be caused by life-threatening conditions such as a heart attack or blood clot in your lungs.   DISCHARGE INSTRUCTIONS:  Call 911 if:   •You have any of the following signs of a heart attack: ?Squeezing, pressure, or pain in your chest  ?You may also have any of the following: ?Discomfort or pain in your back, neck, jaw, stomach, or arm  ?Shortness of breath  ?Nausea or vomiting  ?Lightheadedness or a sudden cold sweat  Seek care immediately if:   •You have chest discomfort that gets worse, even with medicine.  •You cough or vomit blood.   •Your bowel movements are black or bloody.   •You cannot stop vomiting, or it hurts to swallow.   Contact your healthcare provider if:   •You have questions or concerns about your condition or care.  Medicines:   •Medicines may be given to treat the cause of your chest pain. Examples include pain medicine, anxiety medicine, or medicines to increase blood flow to your heart.   •Do not take certain medicines without asking your healthcare provider first. These include NSAIDs, herbal or vitamin supplements, or hormones (estrogen or progestin).   •Take your medicine as directed. Contact your healthcare provider if you think your medicine is not helping or if you have side effects. Tell him or her if you are allergic to any medicine. Keep a list of the medicines, vitamins, and herbs you take. Include the amounts, and when and why you take them. Bring the list or the pill bottles to follow-up visits. Carry your medicine list with you in case of an emergency.  Follow up with your healthcare provider within 72 hours, or as directed: You may need to return for more tests to find the cause of your chest pain. You may be referred to a specialist, such as a cardiologist or gastroenterologist. Write down your questions so you remember to ask them during your visits.   Healthy living tips: The following are general healthy guidelines. If your chest pain is caused by a heart problem, your healthcare provider will give you specific guidelines to follow.  •Do not smoke. Nicotine and other chemicals in cigarettes and cigars can cause lung and heart damage. Ask your healthcare provider for information if you currently smoke and need help to quit. E-cigarettes or smokeless tobacco still contain nicotine. Talk to your healthcare provider before you use these products.   •Eat a variety of healthy, low-fat, low-salt foods. Healthy foods include fruits, vegetables, whole-grain breads, low-fat dairy products, beans, lean meats, and fish. Ask for more information about a heart healthy diet.  •Drink plenty of water every day. Your body is made of mostly water. Water helps your body to control temperature and blood pressure. Ask your healthcare provider how much water you should drink every day.  •Ask about activity. Your healthcare provider will tell you which activities to limit or avoid. Ask when you can drive, return to work, and have sex. Ask about the best exercise plan for you.  •Maintain a healthy weight. Ask your healthcare provider how much you should weigh. Ask him or her to help you create a weight loss plan if you are overweight.   If you have a stent:   •Carry your stent card with you at all times.   •Let all healthcare providers know that you have a stent.

## 2025-03-06 NOTE — ED PROVIDER NOTE - PATIENT PORTAL LINK FT
You can access the FollowMyHealth Patient Portal offered by [facility] by registering at the following website: [email or web address]. By joining [employer]’s FollowMyHealth portal, you will also be able to view your health information using other applications (apps) compatible with our system.

## 2025-03-06 NOTE — ED CDU PROVIDER INITIAL DAY NOTE - PHYSICAL EXAMINATION
Dr Cummings  Vital signs appreciated patient not tachycardic, not tachypneic, not hypoxic satting 100% on room air blood pressure 107/75.  Patient ambulatory in the emergency department without any assistance.  Sitting up in bed ANO x 3.  Able to answer questions in full sentences.  However does appear uncomfortable when laying flat.  No reproducible chest pain or back pain.  Good air entry bilaterally.  No retractions.  No rashes no vesicular lesions on skin.  Abdomen is soft nontender nondistended with no Marroquin sign.  No CVA tenderness.  No calf tenderness bilaterally and no leg swelling.  No pedal edema.

## 2025-03-06 NOTE — ED PROVIDER NOTE - OBJECTIVE STATEMENT
35-year-old female history of depression on Lexapro presents to ED with complaints of right-sided chest pain for the past 2 to 3 days, worsening last night.  Reports that she has felt these episodes of chest pain in the past, and was hospitalized in 2022 for "enlarged heart muscles."  Reports that she had flulike symptoms over the past week, including cough with mucus production.  Patient had strep at the end of January.  Reports that the chest pain today is worse with lying down and better with sitting up.  States that the pain is worse with deep breaths. Reports taking naproxen and Advil last night and this morning with minimal relief.  Denies any nausea, vomiting, abdominal pain, jaw pain, radicular symptoms, dysuria, shortness of breath.

## 2025-03-06 NOTE — ED ADULT TRIAGE NOTE - CHIEF COMPLAINT QUOTE
Pt c/o R sided chest pain worsening over the last 2 days. C/o mild nausea. Reports flu like symptoms 1 week ago. Pt denies any pertinent past medical Hx

## 2025-03-06 NOTE — ED PROVIDER NOTE - ATTENDING CONTRIBUTION TO CARE
Attending Statement: I have personally seen and examined this patient. I have fully participated in the care of this patient. I have reviewed all pertinent clinical information, including history physical exam, plan and the Resident's note and agree except as noted  35-year-old female no significant past medical history presents with chest pain for 2 days.  Pain is located to the right side of the chest rating to the back, pleuritic, worse with laying down.  Does not feel short of breath but is painful to take a deep breath.  No dyspnea on exertion.  No fever no chills no cough.  Had had a URI a couple days ago but symptoms improved now.  No recent travel.  Not on OCPs.  No recent trauma.  No leg swelling no calf tenderness denies any family history of cardiac disease.  Is non-smoker.  No recent surgeries.    Vital signs appreciated patient not tachycardic, not tachypneic, not hypoxic satting 100% on room air blood pressure 107/75.  Patient ambulatory in the emergency department without any assistance.  Sitting up in bed ANO x 3.  Able to answer questions in full sentences.  However does appear uncomfortable when laying flat.  No reproducible chest pain or back pain.  Good air entry bilaterally.  No retractions.  No rashes no vesicular lesions on skin.  Abdomen is soft nontender nondistended with no Marroquin sign.  No CVA tenderness.  No calf tenderness bilaterally and no leg swelling.  No pedal edema.    Plan cardiac monitor, EKG, labs with troponin and D-dimer, coags, chest x-ray, pain meds and reassess.  Likely CDU.  Plan discussed with patient.  EKG sr no RODRIGO

## 2025-03-06 NOTE — ED PROVIDER NOTE - CLINICAL SUMMARY MEDICAL DECISION MAKING FREE TEXT BOX
35-year-old female past medical history of depression on Lexapro presents to ED with 2 to 3 days of right-sided chest pain.  Symptoms preceded by flulike infection.  Right sided chest pain worse with lying down and better with sitting up.  Patient takes Advil/naproxen with minimal relief.  Denies any radiating symptoms, nausea, vomiting, abdominal pain or jaw pain.    Differential includes ACS versus gastritis versus GERD versus myocarditis or pericarditis.  EKG shows normal sinus rhythm, no ST elevations.  Will obtain labs including troponin and chest x-ray, will treat with Ofirmev and reassess.

## 2025-03-06 NOTE — ED PROVIDER NOTE - PHYSICAL EXAMINATION
Ian Montiel DO (PGY1)   Physical Exam:    Gen: NAD, AOx3  Head: NCAT  HEENT: EOMI, PEERLA  Lung: CTAB  CV: RRR  Abd: soft, NT, ND, no guarding, no rigidity, no rebound tenderness, no CVA tenderness   MSK: no visible deformities, ROM normal in UE/LE  Neuro: No focal sensory or motor deficits. Sensation intact to light touch all extremities.  Skin: Warm, well perfused, no rash, no leg swelling  Psych: normal affect, calm

## 2025-03-06 NOTE — ED ADULT NURSE NOTE - OBJECTIVE STATEMENT
Pt received to intake  , awake and alert, A&OX4, ambulatory. C/o chest pain on the right side. pt denies any numbness or tingling. pt placed on cardaic monitor. NSR. Respirations even and unlabored. Denies  SOB, N/V, HA, dizziness, palpitations, blurry vision. 20G IV placed to left hand      . Bed in lowest position, call bell within reach. Safety maintained.

## 2025-03-06 NOTE — ED PROVIDER NOTE - WR ORDER STATUS 1
Chidi Pichardo MD  [id number]  [place] [address] [phone]             Shellie Kim MD  11/26/17 1920     Resulted

## 2025-03-06 NOTE — ED CDU PROVIDER INITIAL DAY NOTE - NSCAREINITIATED _GEN_ER
Add 52258 Cpt? (Important Note: In 2017 The Use Of 64527 Is Being Tracked By Cms To Determine Future Global Period Reimbursement For Global Periods): no Detail Level: Detailed Gilma Cummings(Attending)

## 2025-03-06 NOTE — ED CDU PROVIDER INITIAL DAY NOTE - OBJECTIVE STATEMENT
Dr Cummings  35-year-old female no significant past medical history presents with chest pain for 2 days.  Pain is located to the right side of the chest rating to the back, pleuritic, worse with laying down.  Does not feel short of breath but is painful to take a deep breath.  No dyspnea on exertion.  No fever no chills no cough.  Had had a URI a couple days ago but symptoms improved now.  No recent travel.  Not on OCPs.  No recent trauma.  No leg swelling no calf tenderness denies any family history of cardiac disease.  Is non-smoker.  No recent surgeries.  IN the ED trop and dimer neg, cxr clear. pt continued to endorse SOB/ CP. plan  Hasn't see a cardiologist in over two years. Had seen a cardiologist in [facility] for ?enlarged heartbut pt did not followup .

## 2025-03-06 NOTE — ED CDU PROVIDER INITIAL DAY NOTE - CLINICAL SUMMARY MEDICAL DECISION MAKING FREE TEXT BOX
Plan cardiac monitor, echo in am, cardiology evaluation Plan cardiac monitor, echo in am, cardiology evaluation  plan dw pt and LEOBARDO Solis, will follow pt

## 2025-03-06 NOTE — ED PROVIDER NOTE - PROGRESS NOTE DETAILS
Patient advised not to take advil and naproxen together, as they are both NSAIDs. Advised to take only one or the other.  Ian Montiel DO (PGY1) I spoke to the patient in regards to staying for an echocardiogram. Patient reports that she would not like to stay in our observation unit for echo as she has kids at home and would like to go home. Patient understands the risks and is amendable to following up with a cardiologist outpatient. We discussed return precautions, including worsening chest pain or shortness of breath. Will provide the patient with a cardiology follow up phone number upon discharge.  Ian Montiel DO (PGY1) She feels better with the Toradol.  Offered CDU for continuous cardiac monitor, echo and palliative care morning.  She has not seen her cardiologist from [place] in over 2 years.  She will go home and follow-up, She refused to stay in CDU overnight  States she has 4 children at home and she has nobody to watch them. Explained importance to return for worsening of symptoms. pt now willing to stay in CDU for cardiac monitor, tele doctor in am and echo. She feels better with the Toradol.  Offered CDU for continuous cardiac monitor, echo and tele doc morning.  She has not seen her cardiologist from [place] in over 2 years.  She will go home and follow-up, She refused to stay in CDU overnight  States she has 4 children at home and she has nobody to watch them. Explained importance to return for worsening of symptoms.

## 2025-03-06 NOTE — ED PROVIDER NOTE - NSTIMEPROVIDERCAREINITIATE_GEN_ER
4 Eyes Skin Assessment Completed by Toi RN and PIERO John.    Head WDL  Ears WDL  Nose Redness and Scab  Mouth WDL  Neck WDL  Breast/Chest WDL  Shoulder Blades WDL  Spine WDL  (R) Arm/Elbow/Hand WDL  (L) Arm/Elbow/Hand Edema  Abdomen WDL  Groin WDL  Scrotum/Coccyx/Buttocks Redness  (R) Leg WDL  (L) Leg WDL  (R) Heel/Foot/Toe WDL  (L) Heel/Foot/Toe WDL          Devices In Places ECG, Blood Pressure Cuff, Pulse Ox, Nguyen, OG/NG, Central Line, and BMS      Interventions In Place Pillows    Possible Skin Injury No    Pictures Uploaded Into Epic Yes  Wound Consult Placed N/A  RN Wound Prevention Protocol Ordered Yes    06-Mar-2025 09:52

## 2025-03-07 ENCOUNTER — RESULT REVIEW (OUTPATIENT)
Age: 35
End: 2025-03-07

## 2025-03-07 DIAGNOSIS — D69.6 THROMBOCYTOPENIA, UNSPECIFIED: ICD-10-CM

## 2025-03-07 DIAGNOSIS — R07.9 CHEST PAIN, UNSPECIFIED: ICD-10-CM

## 2025-03-07 DIAGNOSIS — D50.9 IRON DEFICIENCY ANEMIA, UNSPECIFIED: ICD-10-CM

## 2025-03-07 DIAGNOSIS — F32.9 MAJOR DEPRESSIVE DISORDER, SINGLE EPISODE, UNSPECIFIED: ICD-10-CM

## 2025-03-07 DIAGNOSIS — Z29.9 ENCOUNTER FOR PROPHYLACTIC MEASURES, UNSPECIFIED: ICD-10-CM

## 2025-03-07 PROBLEM — Z78.9 OTHER SPECIFIED HEALTH STATUS: Chronic | Status: INACTIVE | Noted: 2024-11-09 | Resolved: 2025-03-07

## 2025-03-07 LAB
ADD ON TEST-SPECIMEN IN LAB: SIGNIFICANT CHANGE UP
ALBUMIN SERPL ELPH-MCNC: 3.7 G/DL — SIGNIFICANT CHANGE UP (ref 3.3–5)
ALP SERPL-CCNC: 54 U/L — SIGNIFICANT CHANGE UP (ref 40–120)
ALT FLD-CCNC: 11 U/L — SIGNIFICANT CHANGE UP (ref 4–33)
ANION GAP SERPL CALC-SCNC: 11 MMOL/L — SIGNIFICANT CHANGE UP (ref 7–14)
APPEARANCE UR: CLEAR — SIGNIFICANT CHANGE UP
AST SERPL-CCNC: 19 U/L — SIGNIFICANT CHANGE UP (ref 4–32)
BASOPHILS # BLD AUTO: 0 K/UL — SIGNIFICANT CHANGE UP (ref 0–0.2)
BASOPHILS NFR BLD AUTO: 0 % — SIGNIFICANT CHANGE UP (ref 0–2)
BILIRUB SERPL-MCNC: 0.3 MG/DL — SIGNIFICANT CHANGE UP (ref 0.2–1.2)
BILIRUB UR-MCNC: NEGATIVE — SIGNIFICANT CHANGE UP
BUN SERPL-MCNC: 14 MG/DL — SIGNIFICANT CHANGE UP (ref 7–23)
CALCIUM SERPL-MCNC: 9.2 MG/DL — SIGNIFICANT CHANGE UP (ref 8.4–10.5)
CHLORIDE SERPL-SCNC: 104 MMOL/L — SIGNIFICANT CHANGE UP (ref 98–107)
CO2 SERPL-SCNC: 21 MMOL/L — LOW (ref 22–31)
COLOR SPEC: YELLOW — SIGNIFICANT CHANGE UP
CREAT SERPL-MCNC: 0.36 MG/DL — LOW (ref 0.5–1.3)
DIFF PNL FLD: NEGATIVE — SIGNIFICANT CHANGE UP
EGFR: 137 ML/MIN/1.73M2 — SIGNIFICANT CHANGE UP
EGFR: 137 ML/MIN/1.73M2 — SIGNIFICANT CHANGE UP
EOSINOPHIL # BLD AUTO: 0.08 K/UL — SIGNIFICANT CHANGE UP (ref 0–0.5)
EOSINOPHIL NFR BLD AUTO: 0.9 % — SIGNIFICANT CHANGE UP (ref 0–6)
GLUCOSE SERPL-MCNC: 90 MG/DL — SIGNIFICANT CHANGE UP (ref 70–99)
GLUCOSE UR QL: NEGATIVE MG/DL — SIGNIFICANT CHANGE UP
HCT VFR BLD CALC: 32.7 % — LOW (ref 34.5–45)
HGB BLD-MCNC: 11.2 G/DL — LOW (ref 11.5–15.5)
IANC: 5.11 K/UL — SIGNIFICANT CHANGE UP (ref 1.8–7.4)
KETONES UR-MCNC: NEGATIVE MG/DL — SIGNIFICANT CHANGE UP
LEUKOCYTE ESTERASE UR-ACNC: NEGATIVE — SIGNIFICANT CHANGE UP
LIDOCAIN IGE QN: 29 U/L — SIGNIFICANT CHANGE UP (ref 7–60)
LYMPHOCYTES # BLD AUTO: 1.52 K/UL — SIGNIFICANT CHANGE UP (ref 1–3.3)
LYMPHOCYTES # BLD AUTO: 17 % — SIGNIFICANT CHANGE UP (ref 13–44)
MCHC RBC-ENTMCNC: 29.2 PG — SIGNIFICANT CHANGE UP (ref 27–34)
MCHC RBC-ENTMCNC: 34.3 G/DL — SIGNIFICANT CHANGE UP (ref 32–36)
MCV RBC AUTO: 85.2 FL — SIGNIFICANT CHANGE UP (ref 80–100)
MONOCYTES # BLD AUTO: 0.64 K/UL — SIGNIFICANT CHANGE UP (ref 0–0.9)
MONOCYTES NFR BLD AUTO: 7.1 % — SIGNIFICANT CHANGE UP (ref 2–14)
NEUTROPHILS # BLD AUTO: 5.6 K/UL — SIGNIFICANT CHANGE UP (ref 1.8–7.4)
NEUTROPHILS NFR BLD AUTO: 62.5 % — SIGNIFICANT CHANGE UP (ref 43–77)
NITRITE UR-MCNC: NEGATIVE — SIGNIFICANT CHANGE UP
PH UR: 6 — SIGNIFICANT CHANGE UP (ref 5–8)
PLATELET # BLD AUTO: 87 K/UL — LOW (ref 150–400)
POTASSIUM SERPL-MCNC: 4.1 MMOL/L — SIGNIFICANT CHANGE UP (ref 3.5–5.3)
POTASSIUM SERPL-SCNC: 4.1 MMOL/L — SIGNIFICANT CHANGE UP (ref 3.5–5.3)
PROT SERPL-MCNC: 6.9 G/DL — SIGNIFICANT CHANGE UP (ref 6–8.3)
PROT UR-MCNC: NEGATIVE MG/DL — SIGNIFICANT CHANGE UP
RBC # BLD: 3.84 M/UL — SIGNIFICANT CHANGE UP (ref 3.8–5.2)
RBC # FLD: 13.1 % — SIGNIFICANT CHANGE UP (ref 10.3–14.5)
SODIUM SERPL-SCNC: 136 MMOL/L — SIGNIFICANT CHANGE UP (ref 135–145)
SP GR SPEC: 1.04 — HIGH (ref 1–1.03)
UROBILINOGEN FLD QL: 0.2 MG/DL — SIGNIFICANT CHANGE UP (ref 0.2–1)
WBC # BLD: 8.96 K/UL — SIGNIFICANT CHANGE UP (ref 3.8–10.5)
WBC # FLD AUTO: 8.96 K/UL — SIGNIFICANT CHANGE UP (ref 3.8–10.5)

## 2025-03-07 PROCEDURE — 76705 ECHO EXAM OF ABDOMEN: CPT | Mod: 26

## 2025-03-07 PROCEDURE — 99233 SBSQ HOSP IP/OBS HIGH 50: CPT

## 2025-03-07 PROCEDURE — 71275 CT ANGIOGRAPHY CHEST: CPT | Mod: 26

## 2025-03-07 PROCEDURE — 74177 CT ABD & PELVIS W/CONTRAST: CPT | Mod: 26

## 2025-03-07 PROCEDURE — 99223 1ST HOSP IP/OBS HIGH 75: CPT | Mod: GC

## 2025-03-07 RX ORDER — CYCLOBENZAPRINE HYDROCHLORIDE 15 MG/1
5 CAPSULE, EXTENDED RELEASE ORAL THREE TIMES A DAY
Refills: 0 | Status: DISCONTINUED | OUTPATIENT
Start: 2025-03-07 | End: 2025-03-08

## 2025-03-07 RX ORDER — FLUTICASONE PROPIONATE 50 UG/1
1 SPRAY, METERED NASAL
Refills: 0 | Status: DISCONTINUED | OUTPATIENT
Start: 2025-03-07 | End: 2025-03-09

## 2025-03-07 RX ORDER — BUSPIRONE HYDROCHLORIDE 15 MG/1
10 TABLET ORAL
Refills: 0 | Status: DISCONTINUED | OUTPATIENT
Start: 2025-03-07 | End: 2025-03-09

## 2025-03-07 RX ORDER — BUSPIRONE HYDROCHLORIDE 15 MG/1
1 TABLET ORAL
Refills: 0 | DISCHARGE

## 2025-03-07 RX ORDER — ENOXAPARIN SODIUM 100 MG/ML
40 INJECTION SUBCUTANEOUS EVERY 24 HOURS
Refills: 0 | Status: DISCONTINUED | OUTPATIENT
Start: 2025-03-07 | End: 2025-03-09

## 2025-03-07 RX ORDER — RIMEGEPANT SULFATE 75 MG/75MG
1 TABLET, ORALLY DISINTEGRATING ORAL
Refills: 0 | DISCHARGE

## 2025-03-07 RX ORDER — BUPROPION HYDROBROMIDE 522 MG/1
150 TABLET, EXTENDED RELEASE ORAL DAILY
Refills: 0 | Status: DISCONTINUED | OUTPATIENT
Start: 2025-03-07 | End: 2025-03-09

## 2025-03-07 RX ORDER — ACETAMINOPHEN 500 MG/5ML
1000 LIQUID (ML) ORAL EVERY 6 HOURS
Refills: 0 | Status: DISCONTINUED | OUTPATIENT
Start: 2025-03-07 | End: 2025-03-09

## 2025-03-07 RX ORDER — ONDANSETRON HCL/PF 4 MG/2 ML
4 VIAL (ML) INJECTION ONCE
Refills: 0 | Status: COMPLETED | OUTPATIENT
Start: 2025-03-07 | End: 2025-03-07

## 2025-03-07 RX ORDER — MAGNESIUM, ALUMINUM HYDROXIDE 200-200 MG
30 TABLET,CHEWABLE ORAL DAILY
Refills: 0 | Status: DISCONTINUED | OUTPATIENT
Start: 2025-03-08 | End: 2025-03-09

## 2025-03-07 RX ORDER — LORATADINE 5 MG/5ML
1 SOLUTION ORAL
Refills: 0 | DISCHARGE

## 2025-03-07 RX ORDER — FERROUS SULFATE 137(45) MG
325 TABLET, EXTENDED RELEASE ORAL
Refills: 0 | DISCHARGE

## 2025-03-07 RX ORDER — GALCANEZUMAB 120 MG/ML
120 INJECTION, SOLUTION SUBCUTANEOUS
Refills: 0 | DISCHARGE

## 2025-03-07 RX ORDER — LIDOCAINE HYDROCHLORIDE 20 MG/ML
1 JELLY TOPICAL ONCE
Refills: 0 | Status: COMPLETED | OUTPATIENT
Start: 2025-03-07 | End: 2025-03-07

## 2025-03-07 RX ORDER — BREXPIPRAZOLE 0.5 MG/1
1 TABLET ORAL
Refills: 0 | DISCHARGE

## 2025-03-07 RX ORDER — SIMETHICONE 80 MG
1 TABLET,CHEWABLE ORAL
Refills: 0 | DISCHARGE

## 2025-03-07 RX ORDER — BUPROPION HYDROBROMIDE 522 MG/1
1 TABLET, EXTENDED RELEASE ORAL
Refills: 0 | DISCHARGE

## 2025-03-07 RX ORDER — FLUTICASONE PROPIONATE 220 UG/1
1 AEROSOL, METERED RESPIRATORY (INHALATION)
Refills: 0 | DISCHARGE

## 2025-03-07 RX ORDER — MAGNESIUM, ALUMINUM HYDROXIDE 200-200 MG
30 TABLET,CHEWABLE ORAL ONCE
Refills: 0 | Status: COMPLETED | OUTPATIENT
Start: 2025-03-07 | End: 2025-03-07

## 2025-03-07 RX ORDER — ONDANSETRON HCL/PF 4 MG/2 ML
4 VIAL (ML) INJECTION EVERY 6 HOURS
Refills: 0 | Status: DISCONTINUED | OUTPATIENT
Start: 2025-03-07 | End: 2025-03-09

## 2025-03-07 RX ADMIN — BUSPIRONE HYDROCHLORIDE 10 MILLIGRAM(S): 15 TABLET ORAL at 19:13

## 2025-03-07 RX ADMIN — LIDOCAINE HYDROCHLORIDE 1 PATCH: 20 JELLY TOPICAL at 14:27

## 2025-03-07 RX ADMIN — Medication 2 MILLIGRAM(S): at 03:15

## 2025-03-07 RX ADMIN — Medication 2 MILLIGRAM(S): at 17:10

## 2025-03-07 RX ADMIN — Medication 2 MILLIGRAM(S): at 09:08

## 2025-03-07 RX ADMIN — ENOXAPARIN SODIUM 40 MILLIGRAM(S): 100 INJECTION SUBCUTANEOUS at 18:02

## 2025-03-07 RX ADMIN — Medication 4 MILLIGRAM(S): at 14:11

## 2025-03-07 RX ADMIN — FLUTICASONE PROPIONATE 1 SPRAY(S): 50 SPRAY, METERED NASAL at 18:54

## 2025-03-07 RX ADMIN — Medication 30 MILLILITER(S): at 14:29

## 2025-03-07 RX ADMIN — Medication 2 MILLIGRAM(S): at 09:45

## 2025-03-07 RX ADMIN — Medication 10 MILLIGRAM(S): at 18:02

## 2025-03-07 RX ADMIN — Medication 2 MILLIGRAM(S): at 08:14

## 2025-03-07 RX ADMIN — KETOROLAC TROMETHAMINE 30 MILLIGRAM(S): 30 INJECTION, SOLUTION INTRAMUSCULAR; INTRAVENOUS at 02:26

## 2025-03-07 RX ADMIN — KETOROLAC TROMETHAMINE 30 MILLIGRAM(S): 30 INJECTION, SOLUTION INTRAMUSCULAR; INTRAVENOUS at 03:26

## 2025-03-07 RX ADMIN — KETOROLAC TROMETHAMINE 30 MILLIGRAM(S): 30 INJECTION, SOLUTION INTRAMUSCULAR; INTRAVENOUS at 10:23

## 2025-03-07 RX ADMIN — Medication 2 MILLIGRAM(S): at 07:44

## 2025-03-07 RX ADMIN — Medication 20 MILLIGRAM(S): at 14:28

## 2025-03-07 RX ADMIN — Medication 1000 MILLIGRAM(S): at 18:53

## 2025-03-07 RX ADMIN — Medication 2 MILLIGRAM(S): at 16:29

## 2025-03-07 RX ADMIN — BUPROPION HYDROBROMIDE 150 MILLIGRAM(S): 522 TABLET, EXTENDED RELEASE ORAL at 18:55

## 2025-03-07 RX ADMIN — KETOROLAC TROMETHAMINE 30 MILLIGRAM(S): 30 INJECTION, SOLUTION INTRAMUSCULAR; INTRAVENOUS at 11:15

## 2025-03-07 RX ADMIN — Medication 2 MILLIGRAM(S): at 03:41

## 2025-03-07 RX ADMIN — Medication 40 MILLIGRAM(S): at 15:23

## 2025-03-07 RX ADMIN — CYCLOBENZAPRINE HYDROCHLORIDE 5 MILLIGRAM(S): 15 CAPSULE, EXTENDED RELEASE ORAL at 18:54

## 2025-03-07 NOTE — H&P ADULT - PROBLEM SELECTOR PLAN 2
DVT ppx: Lovenox  Diet: DASH/TLC  Dispo: pending  Code: full - c/w home med: bupropion and wellbutrin  - Currently without depressive symptoms Rituxan Counseling:  I discussed with the patient the risks of Rituxan infusions. Side effects can include infusion reactions, severe drug rashes including mucocutaneous reactions, reactivation of latent hepatitis and other infections and rarely progressive multifocal leukoencephalopathy.  All of the patient's questions and concerns were addressed.

## 2025-03-07 NOTE — ED CDU PROVIDER SUBSEQUENT DAY NOTE - HISTORY
36 yo female, PMH depression, on Lexapro, presented to the ED c/o right sided chest pain; pt states pain started approx one week ago, atraumatic, no known trigger in onset, worse over past 3 days.  Pt points with the palm of her hand over the anterior mid chest, and lateral mid to lower chest; states pain "wraps around" and worse with inspiration.  In the ED, VSS, pt afebrile.  EKG NSR 78 bpm.  CBC unremarkable.  CMP initially severely hemolyzed; repeat showed bicarb 17, otherwise was unremarkable.  Lipase 38.  INR 0.92.  D-dimer <150, trop <6 ---> <6.  HCG <1.0.  A1c 4.9.  CXR official radiology report states: "...FINDINGS: The lungs are equally aerated and free of focal consolidations. The heart is not enlarged. There are no effusions or pneumothorax. There is no acute bone pathology. COMPARISON: November 9, 2024  IMPRESSION: Clear lungs.".  Pt had been sent to CDU for tele monitoring, echo, and Tele Doc cardiologist evaluation.    In the interim, pt has been clinically stable, has been intermittently complaining of pain; states no meds given thus far have significantly alleviated pain, just gave partial temporary relief.  CTA chest is ordered for further evaluation.  No other issues thus far.

## 2025-03-07 NOTE — ED CDU PROVIDER SUBSEQUENT DAY NOTE - CLINICAL SUMMARY MEDICAL DECISION MAKING FREE TEXT BOX
34 yo female, PMH depression, on Lexapro, presented to the ED c/o right sided chest pain; pt states pain started approx one week ago, atraumatic, no known trigger in onset, worse over past 3 days.  Pt points with the palm of her hand over the anterior mid chest, and lateral mid to lower chest; states pain "wraps around" and worse with inspiration.  In the ED, VSS, pt afebrile.  EKG NSR 78 bpm.  CBC unremarkable.  CMP initially severely hemolyzed; repeat showed bicarb 17, otherwise was unremarkable.  Lipase 38.  INR 0.92.  D-dimer <150, trop <6 ---> <6.  HCG <1.0.  A1c 4.9.  CXR official radiology report states: "...FINDINGS: The lungs are equally aerated and free of focal consolidations. The heart is not enlarged. There are no effusions or pneumothorax. There is no acute bone pathology. COMPARISON: November 9, 2024  IMPRESSION: Clear lungs.".  Pt had been sent to CDU for tele monitoring, echo, and Tele Doc cardiologist evaluation.    In the interim, pt has been clinically stable, has been intermittently complaining of pain; states no meds given thus far have significantly alleviated pain, just gave partial temporary relief.  CTA chest is ordered for further evaluation.

## 2025-03-07 NOTE — ED CDU PROVIDER SUBSEQUENT DAY NOTE - PHYSICAL EXAMINATION
CONSTITUTIONAL:  Well appearing, awake, alert, oriented to person, place, time/situation and in no apparent distress.  Pt. is objectively comfortable appearing and verbalizing in full, clear, effortless sentences.  ENMT: NC/AT.  Airway patent.  Nasal mucosa clear.  Moist mucous membranes.  Neck supple.  EYES:  Clear OU.  CARDIAC:  Normal rate, regular rhythm.  Heart sounds S1 S2.  No murmurs, gallops, or rubs.  RESPIRATORY:  Breath sounds clear and equal bilaterally.  No wheezes, no rales, no rhonchi.  GASTROINTESTINAL:  Abdomen soft, non-distended, non-tender.  Negative Marroquin's sign.  No rebound, no guarding.  NEUROLOGICAL:  Alert and oriented to person/place/time/situation.  No focal deficits; no tremors noted.   MUSCULOSKELETAL:  Range of motion is not limited.  No midline back TTP in thoracic region.  No reproducible TTP of thoracic region.  BREAST: exam done with IAN Guillen as chaperone; no palpable mass bilaterally, subjective generalized TTP bilateral breasts; no skin changes noted.  SKIN:  Skin color unremarkable.  Skin warm, dry, and intact.  No rash/lesion/erythema/bruising noted to thoracic region.  PSYCHIATRIC:  Alert and oriented to person/place/time/situation.  Mood and affect WNL.  No apparent risk to self or others.

## 2025-03-07 NOTE — CHART NOTE - NSCHARTNOTEFT_GEN_A_CORE
Practitioner Count: 1  Pharmacy Count: 1  Current Opioid Prescriptions: 0  Current Benzodiazepine Prescriptions: 0  Current Stimulant Prescriptions: 0      Patient Demographic Information (PDI)       PDI	First Name	Last Name	Birth Date	Gender	Street Address	City	State	Zip Code  BECK Mayers	1990	Female	[id number]	[facility]	[address]    Prescription Information      PDI Filter:    PDI	Current Rx	Drug Type	Rx Written	Rx Dispensed	Drug	Quantity	Days Supply	Prescriber Name	Prescriber PATRICIO #	Payment Method	Dispenser  A	N	B	01/10/2025	01/14/2025	clonazepam 1 mg tablet	45	30	Georgi, Samuel	[id number]	Medicaid	[facility]  A	N		01/10/2025	01/14/2025	zolpidem tartrate 10 mg tablet	30	30	Georgi, Samuel	[id number]	Medicaid	[facility]  A	N	B	11/12/2024	11/15/2024	clonazepam 1 mg tablet	60	30	Georgi, Samuel	[id number]	Medicaid	[facility]  A	N		11/12/2024	11/15/2024	zolpidem tartrate 10 mg tablet	30	30	Georgi, Samuel	[id number]	Medicaid	[facility]  A	N		10/08/2024	10/09/2024	zolpidem tartrate 10 mg tablet	30	30	Georgi, Samuel	[id number]	Medicaid	[facility]  A	N	B	10/08/2024	10/09/2024	clonazepam 0.5 mg tablet	25	25	Georgi, Samuel	[id number]	Medicaid	[facility]  A	N		09/10/2024	09/13/2024	zolpidem tartrate 10 mg tablet	30	30	Georgi Samuel	[id number]	Medicaid	[facility]  A	N		06/06/2024	08/08/2024	zolpidem tartrate 10 mg tablet	30	30	Georgi, Samuel	[id number]	Medicaid	[facility]  A	N		07/09/2024	08/08/2024	zolpidem tartrate 10 mg tablet	30	30	Georgi, Samuel	[id number]	Medicaid	[facility]  A	N		08/07/2024	08/08/2024	zolpidem tartrate 10 mg tablet	30	30	Georgi, Samuel	[id number]	Medicaid	[facility]  A	N		04/29/2024	05/03/2024	zolpidem tartrate 10 mg tablet	30	30	Georgi, Samuel	[id number]	Medicaid	[facility]  A	N		03/19/2024	03/20/2024	zolpidem tartrate 10 mg tablet	30	30	GeorgiSamuel	[id number]	Medicaid	[facility]    * - Details of Drug Type : O = Opioid, B = Benzodiazepine, S = Stimulant    * - Drugs marked with an asterisk are compound drugs. If the compound drug is made up of more than one controlled substance, then each controlled substance will be a separate row in the table.

## 2025-03-07 NOTE — H&P ADULT - NSHPPHYSICALEXAM_GEN_ALL_CORE
LOS: 1d    VITALS:   T(C): 36.5 (03-07-25 @ 14:08), Max: 36.8 (03-06-25 @ 21:21)  HR: 62 (03-07-25 @ 14:08) (56 - 76)  BP: 99/63 (03-07-25 @ 14:08) (99/63 - 109/74)  RR: 18 (03-07-25 @ 14:08) (16 - 18)  SpO2: 100% (03-07-25 @ 14:08) (98% - 100%)    GENERAL: Uncomfortable  HEAD:  Atraumatic, Normocephalic  EYES: EOMI, PERRLA, conjunctiva and sclera clear  ENT: Moist mucous membranes  NECK: Supple, No elevated JVP.  CHEST/LUNG: Clear to auscultation bilaterally; No rales, rhonchi, or wheezes.   HEART: Regular rate and rhythm; No murmurs, rubs, or gallops  ABDOMEN: Bowel sounds present; Soft, +tender to RUQ, nondistended  EXTREMITIES:  2+ Peripheral Pulses, brisk capillary refill. No clubbing, cyanosis, or edema  NERVOUS SYSTEM:  A&Ox3, no focal deficits   SKIN: No rashes or lesions LOS: 1d    VITALS:   T(C): 36.5 (03-07-25 @ 14:08), Max: 36.8 (03-06-25 @ 21:21)  HR: 62 (03-07-25 @ 14:08) (56 - 76)  BP: 99/63 (03-07-25 @ 14:08) (99/63 - 109/74)  RR: 18 (03-07-25 @ 14:08) (16 - 18)  SpO2: 100% (03-07-25 @ 14:08) (98% - 100%)    GENERAL: Uncomfortable but conversant   HEAD:  Atraumatic, Normocephalic  EYES: EOMI, PERRLA, conjunctiva and sclera clear  ENT: Moist mucous membranes  NECK: Supple, No elevated JVP.  CHEST/LUNG: Clear to auscultation bilaterally; No rales, rhonchi, or wheezes.   HEART: Regular rate and rhythm; No murmurs, rubs, or gallops  ABDOMEN: Bowel sounds present; Soft, No TTP, nondistended  EXTREMITIES:  2+ Peripheral Pulses, brisk capillary refill. No clubbing, cyanosis, or edema  NERVOUS SYSTEM:  A&Ox3, no focal deficits   SKIN: No rashes or lesions

## 2025-03-07 NOTE — H&P ADULT - PROBLEM SELECTOR PLAN 4
DVT ppx: Lovenox  Diet: DASH/TLC  Dispo: pending  Code: full - platelet of 87 on 3/7  - Was in 100s 2/2025 during her opt office visit  - check vitamin B12, folate, LEELEE, ddDNA, HIV, acute hepatitis panel  - continue to trend - platelet of 87 on 3/7  - Was in 100s 2/2025 during her opt office visit  - check vitamin B12, folate, HIV, HCV screen  - Defer on other autoimune workup.  - Review potential medication culprit.  - continue to trend

## 2025-03-07 NOTE — ED CDU PROVIDER SUBSEQUENT DAY NOTE - ATTENDING APP SHARED VISIT CONTRIBUTION OF CARE
I have personally performed a face to face medical and diagnostic evaluation of the patient. I have discussed with and reviewed the GWEN's note and agree with the History, ROS, Physical Exam and MDM unless otherwise indicated. A brief summary of my personal evaluation and impression can be found below. I actively participated in the comanagement of this patient with the GWEN. I have personally reviewed all orders, study/imaging results, medication orders. I discussed indications for consultant evaluation and consultant recommendations with the GWEN when applicable, and have discussed the disposition plan with the GWEN.    Karen PENALOZA: 36 yo female, PMH depression, on Lexapro, presented to the ED c/o right sided chest pain; pt states pain started approx one week ago, atraumatic, no known trigger in onset, worse over past 3 days.  Pt points with the palm of her hand over the anterior mid chest, and lateral mid to lower chest; states pain "wraps around" and worse with inspiration.  In the ED, VSS, pt afebrile.  EKG NSR 78 bpm.  CBC unremarkable.  CMP initially severely hemolyzed; repeat showed bicarb 17, otherwise was unremarkable.  Lipase 38.  INR 0.92.  D-dimer <150, trop <6 ---> <6.  HCG <1.0.  A1c 4.9    No acute events overnight. Labs and imaging reviewed. During morning rounds pt reports is still having significant RUQ area pain, unclear etiology - abdomen is otherwise soft NTND - studies thus far non actionable     All other ROS negative, except as above and as per HPI and ROS section.    VITALS: Initial triage and subsequent vitals have been reviewed by me.  GEN APPEARANCE: Alert, non-toxic, well-appearing, NAD.  HEAD: Atraumatic.  EYES: PERRLa, EOMI, vision grossly intact.   NECK: Supple  CV: RRR, S1S2, no c/r/m/g. Cap refill <2 seconds. No bruits.   LUNGS: CTAB. No abnormal breath sounds.  ABDOMEN: Soft, NTND. No guarding or rebound.   MSK/EXT: No spinal or extremity point tenderness. No CVA ttp. Pelvis stable. No peripheral edema.  NEURO: Alert, follows commands. Weight bearing normal. Speech normal. Sensation and motor normal x4 extremities.   SKIN: Warm, dry and intact. No rash.  PSYCH: Appropriate    Plan/MDM:  exam vss non toxic w PE as above ddx unclear etiology of pts pain - however is still reporting symptoms - studies thus far non actionable, consider possible msk component? pt is s/p serena, given this will check urine and ctap, reassess thereafter.

## 2025-03-07 NOTE — H&P ADULT - PROBLEM SELECTOR PROBLEM 3
consistent with age. NAD. Head:  NC/NT. Airway patent. Ear: Bilateral external auditory ear canals are clear there is no cerumen, erythema or debris present. Bilateral tympanic membrane intact, translucent and normal cone of light. There is no serous effusion or drainage present. Nose: Bilateral turbinates are swollen. No septal deviation. Rhinorrhea present. Mouth: Posterior pharynx with mild erythema and clear postnasal drip. No tonsillar hypertrophy or exudate. Neck:  Normal ROM. Supple. No anterior cervical adenopathy noted. Lungs: CTAB without wheezes, rales, or rhonchi. CV:  Regular rate and rhythm, normal heart sounds, without pathological murmurs, ectopy, gallops, or rubs. GI: Bowel sounds active x4. Abdomen is soft nontender nondistended. There is no guarding or rebound tenderness noted. Skin:  Normal turgor. Warm, dry, without visible rash. Lymphatic: No lymphangitis or adenopathy noted. Neurological:  Oriented. Motor functions intact. Lab / Imaging Results   All laboratory and radiology results have been personally reviewed by myself. Labs:  No results found for this visit on 05/15/23. Imaging: All Radiology results interpreted by Radiologist unless otherwise noted. No orders to display         Assessment / Delcie Kristian was seen today for cough and congestion. Diagnoses and all orders for this visit:    URI with cough and congestion    Other orders  -     azithromycin (ZITHROMAX) 250 MG tablet; Take 1 tablet by mouth See Admin Instructions for 5 days 500mg on day 1 followed by 250mg on days 2 - 5      Disposition:  Disposition: Discharge to home    Discussed with the patient that this is likely a viral infection and will resolve with current conservative measures. Antibiotic stewardship discussed. I did however print out an Azithromycin prescription since the patient is leaving for [place] tomorrow.  She understands to get it filled if her symptoms continue but will
Iron deficiency anemia

## 2025-03-07 NOTE — ED CDU PROVIDER DISPOSITION NOTE - ATTENDING CONTRIBUTION TO CARE
I have personally performed a face to face medical and diagnostic evaluation of the patient. I have discussed with and reviewed the GWEN's note and agree with the History, ROS, Physical Exam and MDM unless otherwise indicated. A brief summary of my personal evaluation and impression can be found below. I actively participated in the comanagement of this patient with the GWEN. I have personally reviewed all orders, study/imaging results, medication orders. I discussed indications for consultant evaluation and consultant recommendations with the GWEN when applicable, and have discussed the disposition plan with the GWEN.    Please see CDU Subsequent day note for further details.

## 2025-03-07 NOTE — H&P ADULT - ASSESSMENT
35-year-old female with no significant past medical history presents with chest pain for 3 days. In the ED/CDU, trop and dimer neg, cxr clear. Pt continued to endorse SOB/ CP. EKG is normal and no structural heart disease noted on echo. Cardiology was consulted in CDU, per cardiology, atypical for angina and unlikely cardiac etiology. CTA chest - no pulmonary embolus. Mild esophageal wall thickening which may represent esophagitis. There is also apparent gastric wall thickening which may be related to underdistention or gastritis. CTAP No evidence of acute intra-abdominal or pelvic pathology.    35-year-old female with no significant past medical history presents with RUQ pain for 3 days. In the ED/CDU, trop and dimer neg, cxr clear. Pt continued to endorse SOB/ CP. EKG is normal and no structural heart disease noted on echo. Cardiology was consulted in CDU, per cardiology, atypical for angina and unlikely cardiac etiology. CTA chest - no pulmonary embolus. Mild esophageal wall thickening which may represent esophagitis. There is also apparent gastric wall thickening which may be related to underdistention or gastritis. CTAP No evidence of acute intra-abdominal or pelvic pathology.  35-year-old female with no significant past medical history presents with RUQ pain for 3 days. In the ED/CDU, trop and dimer neg, cxr clear. Pt continued to endorse SOB/ CP. EKG is normal and no structural heart disease noted on echo. Cardiology was consulted in CDU, per cardiology, atypical for angina and unlikely cardiac etiology. CTA chest - no pulmonary embolus. Mild esophageal wall thickening which may represent esophagitis. There is also apparent gastric wall thickening which may be related to underdistention or gastritis. CTAP No evidence of acute intra-abdominal or pelvic pathology. Plan to start PPI 40mg BID IV, Maalox, pepcid for gastritis. Pain control with Tylenol and breakthrough pain with Morphine as needed. Start muscle relaxant as pt states this helped her in the past. Monitor for thrombocytopenia.

## 2025-03-07 NOTE — PATIENT PROFILE ADULT - FALL HARM RISK - RISK INTERVENTIONS

## 2025-03-07 NOTE — H&P ADULT - NSICDXPASTMEDICALHX_GEN_ALL_CORE_FT
PAST MEDICAL HISTORY:  History of depression      PAST MEDICAL HISTORY:  Chronic thoracic back pain     History of depression     Memory deficit     Migraine     Post traumatic stress disorder (PTSD)

## 2025-03-07 NOTE — H&P ADULT - NSHPREVIEWOFSYSTEMS_GEN_ALL_CORE
REVIEW OF SYSTEMS:    CONSTITUTIONAL: No weakness, fevers or chills  EYES/ENT: No visual changes;  No vertigo or throat pain   NECK: No pain or stiffness  RESPIRATORY: No cough, wheezing, hemoptysis; No shortness of breath  CARDIOVASCULAR: + chest pain. No palpitations  GASTROINTESTINAL: No abdominal or epigastric pain. No nausea, vomiting, or hematemesis; No diarrhea or constipation. No melena or hematochezia.  GENITOURINARY: No dysuria, frequency or hematuria  NEUROLOGICAL: No numbness or weakness  SKIN: No itching, rashes REVIEW OF SYSTEMS:    CONSTITUTIONAL: No weakness, fevers or chills  EYES/ENT: No visual changes;  No vertigo or throat pain   NECK: No pain or stiffness  RESPIRATORY: No cough, wheezing, hemoptysis; No shortness of breath  CARDIOVASCULAR: No chest pain. No palpitations  GASTROINTESTINAL: +RUQ abdominal pain, no epigastric pain. +nausea, no vomiting or hematemesis; No diarrhea or constipation. No melena or hematochezia.  GENITOURINARY: No dysuria, frequency or hematuria  NEUROLOGICAL: No numbness or weakness  SKIN: No itching, rashes

## 2025-03-07 NOTE — ED CDU PROVIDER DISPOSITION NOTE - CLINICAL COURSE
34 y/o female with pmhx of depression, s/p cholecystectomy presents to ED c/o right sided lower chest pain. CTA negative for PE. Possible esophagitis on CT denies abd pain or sternal cp unlikely cause of symptoms. Sent to cdu for cardiac evaluation and echo. Pt pain not controlled today. Tender to touch on right lower ribs. No skin changes or rash. Examined with Dr. Jones. Plan to check CT abdomen and pelvis w/ IV contrast, and check urinalysis. Will provide morphine prn and reassess. 36 y/o female with pmhx of depression, s/p cholecystectomy presents to ED c/o right sided lower chest pain. CTA negative for PE. Possible esophagitis on CT denies abd pain or sternal cp unlikely cause of symptoms. Sent to cdu for cardiac evaluation and echo. Pt pain not controlled today. Tender to touch on right lower ribs. No skin changes or rash. Examined with Dr. Jones. Plan to check CT abdomen and pelvis w/ IV contrast, and check urinalysis. Will provide morphine prn and reassess.  CT with dilatation of bile duct, US checked no signs of choledocholithiasis. Pt pain not controlled. Uncomfortable. Repeat labs and LFTs normal, lipase negative. Pt vomited x 1 today. Provided GI cocktail and antiemetics. Plan to admit to medicine, consult GI for possible gastritis.

## 2025-03-07 NOTE — ED ADULT NURSE REASSESSMENT NOTE - NS ED NURSE REASSESS COMMENT FT1
Pt c/o severe pain under R breast/ribcage area. Pt moaning in pain. EKG in progress. MD Espinoza, April contacted and aware. Awaiting orders for pain medication and provider to bedside.
Pt reported right sided chest pain and right breast pain, Pt stated that pain gets worse with movement and breathing, Pt denies SOB, sinus rhythm on tele, LEOBARDO Barry was informed, percocet administered as ordered, Plan is for ECHO, call bell with in reach, safety maintained, will continue to monitor

## 2025-03-07 NOTE — H&P ADULT - NSHPLABSRESULTS_GEN_ALL_CORE
.  LABS:                         11.2   8.96  )-----------( 87       ( 07 Mar 2025 12:04 )             32.7     03-07    136  |  104  |  14  ----------------------------<  90  4.1   |  21[L]  |  0.36[L]    Ca    9.2      07 Mar 2025 12:04    TPro  6.9  /  Alb  3.7  /  TBili  0.3  /  DBili  x   /  AST  19  /  ALT  11  /  AlkPhos  54  03-07    PT/INR - ( 06 Mar 2025 10:56 )   PT: 10.7 sec;   INR: 0.92 ratio         PTT - ( 06 Mar 2025 10:56 )  PTT:36.6 sec  Urinalysis Basic - ( 07 Mar 2025 12:04 )    Color: x / Appearance: x / SG: x / pH: x  Gluc: 90 mg/dL / Ketone: x  / Bili: x / Urobili: x   Blood: x / Protein: x / Nitrite: x   Leuk Esterase: x / RBC: x / WBC x   Sq Epi: x / Non Sq Epi: x / Bacteria: x            RADIOLOGY, EKG & ADDITIONAL TESTS: Reviewed.

## 2025-03-07 NOTE — H&P ADULT - ATTENDING COMMENTS
35F with PMH of PID, MDD, PTSD, seizure disorder, migraine presenting with RUQ/flank pain x3 days. Pain worse w/ inspiration, palpation. Pt also reporting associated epigastric/sternal pain - which she characterized as burning. She recently followed up @ [facility] w/ neurology. She also recently treated for strep throat (end of Jan/early Feb). Reports similar episode of this pain a few years ago - at the time - she was found to have "inflammed muscles" and was treated with oxycodone which helped her. Workup, per pt, was otherwise negative.     On exam - AFVSS. Pt in bed, appeared  uncomfortable 2' pain. Eyes: EOMI, nonicteric. Neck: Supple. EMNT- MMM. Uvula midline. No ulcerative lesions. Resp: CTAB. Limited effort 2' inspiratory pain. GI: Soft abd, tender to palp RUQ and R flank. Skin: No rashes/lesions on thorax. No ecchymosis. No petechia. Ext: No c/c. Neuro: CN II-XII intact. Speech fluent/face symmetric. Normal gait. STrength 5/5 throughout. Psych- AAOx4. Appropriate mood/affect.    Labs reviewed. Hb 11.2, Plt 87. CMP unremarkable. Imaging reviewed - CT AP- No acute intraabdominal path. CTA chest - Neg PE. Mild esophageal wall thickening and gastric wall thickening noted. Abd US w/ CBD dilation consistent w/ post cholecystectomy status. EKG reviewed. Sinus. No acute ST/T changes.      In summary, this is a 35F w/ PID, MDD, PTSD, seizure disorder, migraine presenting w/ right sided flank/abdominal pain w/ associated substernal CP. Pt monitored in CDU. Seen by cardiology. Low suspicion for acute cardiac process. Workup most notable for possible esophagitis/gastritis. Otherwise, PE ruled. Low suspicion for ACS. Pt now admitted due to ongoing intractable pain.    #Esophagitis/gastritis  -Start protonix 40 IV BID.  -Cont pepcid.  -Trial sulcralfate.  -Defer on further NSAIDs at this point. Will reassess response to above therapy.  -Can trial standing tylenol.  -Avoid further narcotics.  -If no improvement - will call GI for further eval.    #Pleuritic pain  -Trial baclofen  -Standing tylenol.   -Avoid further NSAIDs.    #Thrombocytopenia  #KELLEE  -Reportedly chronic -- abnormalities noted since last year. No significant workup done as per pt.  -Check B12/folate.  -Iron deficient -> cont iron supplement.  -Review med list for potential culprit. No overt bleeding noted at this time.    #MDD  #Migraine  #Seizure disorder  -Psych condition appear stable. No recent seizures - last was a few years ago.  -Continue home meds at this time.    Rest of plan as outlined above.

## 2025-03-07 NOTE — H&P ADULT - PROBLEM SELECTOR PLAN 1
- atypical chest pain with RUQ pain   - EKG wnl  - TTE EF 62%, trace MR  - neg trop and d-dimer  - Cardiology was consulted in CDU, per cardiology, atypical for angina and unlikely cardiac etiology.   - CTA chest - no pulmonary embolus. Mild esophageal wall thickening which may represent esophagitis. There is also apparent gastric wall thickening which may be related to underdistention or gastritis.   - CTAP: No evidence of acute intra-abdominal or pelvic pathology.   - received 30mg x 3 Toradol prior to admission  - s/p Maalox, pepcid    Plan:  - repeat EKG  - C/w PPI IV daily  - Zofran PRN  - Pain control with Morphine, Lidocaine patch  - Consider GI consult if pain persistent - atypical chest/RUQ pain; similar episode 5 years ago  - EKG wnl  - TTE EF 62%, trace MR  - neg trop and d-dimer  - Cardiology was consulted in CDU, per cardiology, atypical for angina and unlikely cardiac etiology.   - CTA chest - no pulmonary embolus. Mild esophageal wall thickening which may represent esophagitis. There is also apparent gastric wall thickening which may be related to underdistention or gastritis.   - CTAP: No evidence of acute intra-abdominal or pelvic pathology.   - received 30mg x 3 Toradol prior to admission  - s/p Maalox, pepcid    Plan:  - Start Cyclobenzaprine  - C/w PPI IV daily, Pepcid daily  - Zofran PRN  - Pain control with Morphine, Lidocaine patch  - Consider GI consult if pain persistent - atypical chest/RUQ pain; similar episode 5 years ago  - EKG wnl  - TTE EF 62%, trace MR  - neg trop and d-dimer  - Cardiology was consulted in CDU, per cardiology, atypical for angina and unlikely cardiac etiology.   - CTA chest - no pulmonary embolus. Mild esophageal wall thickening which may represent esophagitis. There is also apparent gastric wall thickening which may be related to underdistention or gastritis.   - CTAP: No evidence of acute intra-abdominal or pelvic pathology.   - received 30mg x 3 Toradol prior to admission  - s/p Maalox, pepcid    Plan:  - Start Cyclobenzaprine 5mg q8  - C/w PPI IV daily, Pepcid daily, Maalox daily  - Zofran PRN  - Pain control with Tylenol and Lidocaine patch  - Consider GI consult if pain persistent in the AM - atypical chest/RUQ pain; similar episode 5 years ago  - EKG wnl  - TTE EF 62%, trace MR  - neg trop and d-dimer  - Cardiology was consulted in CDU, per cardiology, atypical for angina and unlikely cardiac etiology.   - CTA chest - no pulmonary embolus. Mild esophageal wall thickening which may represent esophagitis. There is also apparent gastric wall thickening which may be related to underdistention or gastritis.   - CTAP: No evidence of acute intra-abdominal or pelvic pathology.   - received 30mg x 3 Toradol prior to admission  - s/p Maalox, pepcid    Plan:  - Start Cyclobenzaprine 5mg q8  - C/w PPI IV daily, Pepcid daily, Maalox daily  - Zofran PRN  - Pain control with Tylenol and Lidocaine patch  - Consider GI consult if pain persistent in the AM  - Avoid NSAIDS

## 2025-03-07 NOTE — H&P ADULT - HISTORY OF PRESENT ILLNESS
35-year-old female with no significant past medical history presents with chest pain for 3 days. States that she has right sided chest pain that radiating to the back, pleuritic, and worse with laying down. No dyspnea on exertion.  No fever no chills. Had had a URI a couple days ago but symptoms improved now.  No recent travel.  Not on OCPs.  No recent trauma.  No leg swelling no calf tenderness denies any family history of cardiac disease.  Is non-smoker.  No recent surgeries.    In the ED/CDU, trop and dimer neg, cxr clear. Pt continued to endorse SOB/ CP. EKG is normal and no structural heart disease noted on echo. Cardiology was consulted in CDU, per cardiology, atypical for angina and unlikely cardiac etiology.   35-year-old female with past medical history of MDD, PTSD, chronic b/l thoracic back pain, and migraine presents with right upper abdominal pain for 3 days. States that she has RUQ  pain with right sided back that is worse with deep breath and eating. +nausea. Last BM 2 days ago. Pt has been taking Advil 800mg q4 hrs with no improvement. Denies SOB, fever, chills, leg swelling, calf tenderness, focal weakness or numbness. Had had a URI a week ago but symptoms improved now. Denies recent travel, OCPs, or recent trauma.  Had similar pain in 2020, seen at [facility] and resolved after few days. [email or web address], pt was treated with Advil at that time.    In the ED/CDU, trop and dimer neg, cxr clear. Pt continued to endorse SOB/ CP. EKG is normal and no structural heart disease noted on echo. Cardiology was consulted in CDU, per cardiology, atypical for angina and unlikely cardiac etiology.

## 2025-03-08 LAB
ALBUMIN SERPL ELPH-MCNC: 3.3 G/DL — SIGNIFICANT CHANGE UP (ref 3.3–5)
ALP SERPL-CCNC: 51 U/L — SIGNIFICANT CHANGE UP (ref 40–120)
ALT FLD-CCNC: 13 U/L — SIGNIFICANT CHANGE UP (ref 4–33)
ANION GAP SERPL CALC-SCNC: 12 MMOL/L — SIGNIFICANT CHANGE UP (ref 7–14)
AST SERPL-CCNC: 20 U/L — SIGNIFICANT CHANGE UP (ref 4–32)
BASOPHILS # BLD AUTO: 0.02 K/UL — SIGNIFICANT CHANGE UP (ref 0–0.2)
BASOPHILS NFR BLD AUTO: 0.3 % — SIGNIFICANT CHANGE UP (ref 0–2)
BILIRUB SERPL-MCNC: 0.3 MG/DL — SIGNIFICANT CHANGE UP (ref 0.2–1.2)
BUN SERPL-MCNC: 16 MG/DL — SIGNIFICANT CHANGE UP (ref 7–23)
CALCIUM SERPL-MCNC: 8.7 MG/DL — SIGNIFICANT CHANGE UP (ref 8.4–10.5)
CHLORIDE SERPL-SCNC: 108 MMOL/L — HIGH (ref 98–107)
CK MB BLD-MCNC: <2.6 % — HIGH (ref 0–2.5)
CK MB CFR SERPL CALC: <1 NG/ML — SIGNIFICANT CHANGE UP
CK SERPL-CCNC: 39 U/L — SIGNIFICANT CHANGE UP (ref 25–170)
CO2 SERPL-SCNC: 20 MMOL/L — LOW (ref 22–31)
CREAT SERPL-MCNC: 0.46 MG/DL — LOW (ref 0.5–1.3)
EGFR: 129 ML/MIN/1.73M2 — SIGNIFICANT CHANGE UP
EGFR: 129 ML/MIN/1.73M2 — SIGNIFICANT CHANGE UP
EOSINOPHIL # BLD AUTO: 0.1 K/UL — SIGNIFICANT CHANGE UP (ref 0–0.5)
EOSINOPHIL NFR BLD AUTO: 1.5 % — SIGNIFICANT CHANGE UP (ref 0–6)
GAS PNL BLDV: SIGNIFICANT CHANGE UP
GLUCOSE SERPL-MCNC: 89 MG/DL — SIGNIFICANT CHANGE UP (ref 70–99)
HCT VFR BLD CALC: 29.1 % — LOW (ref 34.5–45)
HGB BLD-MCNC: 10.1 G/DL — LOW (ref 11.5–15.5)
HIV 1+2 AB+HIV1 P24 AG SERPL QL IA: SIGNIFICANT CHANGE UP
IANC: 3.73 K/UL — SIGNIFICANT CHANGE UP (ref 1.8–7.4)
IMM GRANULOCYTES NFR BLD AUTO: 0.3 % — SIGNIFICANT CHANGE UP (ref 0–0.9)
LIDOCAIN IGE QN: 30 U/L — SIGNIFICANT CHANGE UP (ref 7–60)
LYMPHOCYTES # BLD AUTO: 2.23 K/UL — SIGNIFICANT CHANGE UP (ref 1–3.3)
LYMPHOCYTES # BLD AUTO: 33.3 % — SIGNIFICANT CHANGE UP (ref 13–44)
MAGNESIUM SERPL-MCNC: 1.9 MG/DL — SIGNIFICANT CHANGE UP (ref 1.6–2.6)
MCHC RBC-ENTMCNC: 29.6 PG — SIGNIFICANT CHANGE UP (ref 27–34)
MCHC RBC-ENTMCNC: 34.7 G/DL — SIGNIFICANT CHANGE UP (ref 32–36)
MCV RBC AUTO: 85.3 FL — SIGNIFICANT CHANGE UP (ref 80–100)
MONOCYTES # BLD AUTO: 0.59 K/UL — SIGNIFICANT CHANGE UP (ref 0–0.9)
MONOCYTES NFR BLD AUTO: 8.8 % — SIGNIFICANT CHANGE UP (ref 2–14)
NEUTROPHILS # BLD AUTO: 3.73 K/UL — SIGNIFICANT CHANGE UP (ref 1.8–7.4)
NEUTROPHILS NFR BLD AUTO: 55.8 % — SIGNIFICANT CHANGE UP (ref 43–77)
NRBC # BLD AUTO: 0 K/UL — SIGNIFICANT CHANGE UP (ref 0–0)
NRBC # FLD: 0 K/UL — SIGNIFICANT CHANGE UP (ref 0–0)
NRBC BLD AUTO-RTO: 0 /100 WBCS — SIGNIFICANT CHANGE UP (ref 0–0)
NT-PROBNP SERPL-SCNC: 100 PG/ML — SIGNIFICANT CHANGE UP
PHOSPHATE SERPL-MCNC: 4.1 MG/DL — SIGNIFICANT CHANGE UP (ref 2.5–4.5)
PLATELET # BLD AUTO: 80 K/UL — LOW (ref 150–400)
POTASSIUM SERPL-MCNC: 4.1 MMOL/L — SIGNIFICANT CHANGE UP (ref 3.5–5.3)
POTASSIUM SERPL-SCNC: 4.1 MMOL/L — SIGNIFICANT CHANGE UP (ref 3.5–5.3)
PROT SERPL-MCNC: 6.2 G/DL — SIGNIFICANT CHANGE UP (ref 6–8.3)
RBC # BLD: 3.41 M/UL — LOW (ref 3.8–5.2)
RBC # FLD: 12.9 % — SIGNIFICANT CHANGE UP (ref 10.3–14.5)
SODIUM SERPL-SCNC: 140 MMOL/L — SIGNIFICANT CHANGE UP (ref 135–145)
TROPONIN T, HIGH SENSITIVITY RESULT: <6 NG/L — SIGNIFICANT CHANGE UP
VIT B12 SERPL-MCNC: 980 PG/ML — HIGH (ref 200–900)
WBC # BLD: 6.69 K/UL — SIGNIFICANT CHANGE UP (ref 3.8–10.5)
WBC # FLD AUTO: 6.69 K/UL — SIGNIFICANT CHANGE UP (ref 3.8–10.5)

## 2025-03-08 PROCEDURE — 93010 ELECTROCARDIOGRAM REPORT: CPT

## 2025-03-08 PROCEDURE — 99232 SBSQ HOSP IP/OBS MODERATE 35: CPT | Mod: GC

## 2025-03-08 PROCEDURE — 99222 1ST HOSP IP/OBS MODERATE 55: CPT | Mod: GC

## 2025-03-08 RX ORDER — CYCLOBENZAPRINE HYDROCHLORIDE 15 MG/1
5 CAPSULE, EXTENDED RELEASE ORAL EVERY 8 HOURS
Refills: 0 | Status: DISCONTINUED | OUTPATIENT
Start: 2025-03-08 | End: 2025-03-09

## 2025-03-08 RX ORDER — SODIUM CHLORIDE 9 G/1000ML
1000 INJECTION, SOLUTION INTRAVENOUS ONCE
Refills: 0 | Status: COMPLETED | OUTPATIENT
Start: 2025-03-08 | End: 2025-03-08

## 2025-03-08 RX ORDER — ACETAMINOPHEN 500 MG/5ML
1000 LIQUID (ML) ORAL ONCE
Refills: 0 | Status: DISCONTINUED | OUTPATIENT
Start: 2025-03-08 | End: 2025-03-08

## 2025-03-08 RX ORDER — KETOROLAC TROMETHAMINE 30 MG/ML
30 INJECTION, SOLUTION INTRAMUSCULAR; INTRAVENOUS ONCE
Refills: 0 | Status: DISCONTINUED | OUTPATIENT
Start: 2025-03-08 | End: 2025-03-08

## 2025-03-08 RX ORDER — ACETAMINOPHEN 500 MG/5ML
1000 LIQUID (ML) ORAL ONCE
Refills: 0 | Status: COMPLETED | OUTPATIENT
Start: 2025-03-08 | End: 2025-03-08

## 2025-03-08 RX ORDER — SODIUM CHLORIDE 9 G/1000ML
1000 INJECTION, SOLUTION INTRAVENOUS
Refills: 0 | Status: DISCONTINUED | OUTPATIENT
Start: 2025-03-08 | End: 2025-03-09

## 2025-03-08 RX ADMIN — Medication 40 MILLIGRAM(S): at 05:26

## 2025-03-08 RX ADMIN — CYCLOBENZAPRINE HYDROCHLORIDE 5 MILLIGRAM(S): 15 CAPSULE, EXTENDED RELEASE ORAL at 02:25

## 2025-03-08 RX ADMIN — BUSPIRONE HYDROCHLORIDE 10 MILLIGRAM(S): 15 TABLET ORAL at 19:58

## 2025-03-08 RX ADMIN — Medication 1000 MILLIGRAM(S): at 17:49

## 2025-03-08 RX ADMIN — SODIUM CHLORIDE 1000 MILLILITER(S): 9 INJECTION, SOLUTION INTRAVENOUS at 21:43

## 2025-03-08 RX ADMIN — Medication 20 MILLIGRAM(S): at 12:20

## 2025-03-08 RX ADMIN — Medication 30 MILLILITER(S): at 12:10

## 2025-03-08 RX ADMIN — Medication 1000 MILLIGRAM(S): at 00:46

## 2025-03-08 RX ADMIN — LIDOCAINE HYDROCHLORIDE 1 PATCH: 20 JELLY TOPICAL at 02:22

## 2025-03-08 RX ADMIN — Medication 1000 MILLIGRAM(S): at 05:37

## 2025-03-08 RX ADMIN — Medication 1000 MILLIGRAM(S): at 23:52

## 2025-03-08 RX ADMIN — Medication 1000 MILLIGRAM(S): at 12:10

## 2025-03-08 RX ADMIN — Medication 1000 MILLIGRAM(S): at 00:00

## 2025-03-08 RX ADMIN — BUPROPION HYDROBROMIDE 150 MILLIGRAM(S): 522 TABLET, EXTENDED RELEASE ORAL at 12:11

## 2025-03-08 RX ADMIN — CYCLOBENZAPRINE HYDROCHLORIDE 5 MILLIGRAM(S): 15 CAPSULE, EXTENDED RELEASE ORAL at 21:13

## 2025-03-08 RX ADMIN — Medication 40 MILLIGRAM(S): at 19:58

## 2025-03-08 RX ADMIN — Medication 10 MILLIGRAM(S): at 12:11

## 2025-03-08 RX ADMIN — Medication 400 MILLIGRAM(S): at 05:23

## 2025-03-08 RX ADMIN — ENOXAPARIN SODIUM 40 MILLIGRAM(S): 100 INJECTION SUBCUTANEOUS at 18:08

## 2025-03-08 RX ADMIN — SODIUM CHLORIDE 1000 MILLILITER(S): 9 INJECTION, SOLUTION INTRAVENOUS at 05:35

## 2025-03-08 RX ADMIN — CYCLOBENZAPRINE HYDROCHLORIDE 5 MILLIGRAM(S): 15 CAPSULE, EXTENDED RELEASE ORAL at 14:49

## 2025-03-08 RX ADMIN — Medication 400 MILLIGRAM(S): at 17:11

## 2025-03-08 RX ADMIN — SODIUM CHLORIDE 125 MILLILITER(S): 9 INJECTION, SOLUTION INTRAVENOUS at 22:54

## 2025-03-08 NOTE — PROGRESS NOTE ADULT - PROBLEM SELECTOR PLAN 1
- atypical chest/RUQ pain; similar episode 5 years ago  - EKG wnl  - TTE EF 62%, trace MR  - neg trop and d-dimer  - Cardiology was consulted in CDU, per cardiology, atypical for angina and unlikely cardiac etiology.   - CTA chest - no pulmonary embolus. Mild esophageal wall thickening which may represent esophagitis. There is also apparent gastric wall thickening which may be related to underdistention or gastritis.   - CTAP: No evidence of acute intra-abdominal or pelvic pathology.   - received 30mg x 3 Toradol prior to admission  - s/p Maalox, pepcid    Plan:  - Start Cyclobenzaprine 5mg q8  - C/w PPI IV daily, Pepcid daily, Maalox daily  - Zofran PRN  - Pain control with Tylenol and Lidocaine patch  - Consider GI consult if pain persistent in the AM  - Avoid NSAIDS - atypical chest/RUQ pain; similar episode 5 years ago  - EKG wnl  - TTE EF 62%, trace MR  - neg trop and d-dimer  - Cardiology was consulted in CDU, per cardiology, atypical for angina and unlikely cardiac etiology.   - CTA chest - no pulmonary embolus. Mild esophageal wall thickening which may represent esophagitis. There is also apparent gastric wall thickening which may be related to underdistention or gastritis.   - CTAP: No evidence of acute intra-abdominal or pelvic pathology.   - received 30mg x 3 Toradol prior to admission  - s/p Maalox, pepcid    Plan:  - Start Cyclobenzaprine 5mg q8  - C/w PPI IV daily, Pepcid daily, Maalox daily  - Zofran PRN  - Pain control with Tylenol and Lidocaine patch  - GI consulted - emailed  - Avoid NSAIDS - atypical chest/RUQ pain; similar episode 5 years ago  - EKG wnl  - TTE EF 62%, trace MR  - neg trop and d-dimer  - Cardiology was consulted in CDU, per cardiology, atypical for angina and unlikely cardiac etiology.   - CTA chest - no pulmonary embolus. Mild esophageal wall thickening which may represent esophagitis. There is also apparent gastric wall thickening which may be related to underdistention or gastritis.   - CTAP: No evidence of acute intra-abdominal or pelvic pathology.   - received 30mg x 3 Toradol prior to admission  - s/p Maalox, pepcid, PPI IV    Plan:  - Start Cyclobenzaprine 5mg q8  - c/w Pepcid daily, Maalox daily  - Zofran PRN  - Pain control with Tylenol and Lidocaine patch  - GI consulted - emailed  - Avoid NSAIDS

## 2025-03-08 NOTE — DISCHARGE NOTE PROVIDER - NSDCQMSTAIRS_GEN_ALL_CORE
Patient Class: Inpatient [101]   REQUIRED: Diagnosis: DRHDS-49 [[id number]]   Estimated Length of Stay: Estimated stay of more than 2 midnights   Admitting Provider: Sherry Sierra [[id number]]   Telemetry/Cardiac Monitoring Required?: Yes No

## 2025-03-08 NOTE — CONSULT NOTE ADULT - ASSESSMENT
35-year-old with no significant past medical history presenting with right upper quadrant pain for 3 days for which GI is consulted.    #Abdominal pain   #Esophagitis/gastric wall thickening  GI consulted for abdominal pain which suspect is most likely MSK pain given location of pain and given no association with PO intake. She is noted to incidentally have gastric wall and esophageal wall thickening on imaging which are nonspecific and may represent underdistention. No urgent indication for endoscopic eval but can be pursued in future given imaging findings- would defer to outpatient.  Recommendations:  -PPI BID for now to assess if improvement in sxs  -Agree w/ cyclobenzaprine as suspect abdominal pain is MSK pain  -If clinically improving ok for d/c from GI perspective; EGD can be pursued in future for imaging findings but not urgent and can be done outpt  -Rest per primary team  -Please call with questions  - Please provide patient with Gastroenterology Clinic number to confirm/arrange appointment; [phone] (Faculty Practice at [address] [facility]) or [phone] ([facility] at [address] [place] [address]) or [phone] ([facility] at [address] [facility]).     Note incomplete until finalized by attending signature/attestation.    Rosemary Nguyen  GI/Hepatology Fellow PGY5    NON-URGENT CONSULTS:  Please email [email or web address]  AT NIGHT AND ON WEEKENDS:  Available on Microsoft Teams  [phone] ([employer] Pager)    For urgent consults, please contact on call GI team. See Amion schedule ([facility]) or [employer] system ([facility])     35-year-old with no significant past medical history presenting with right upper quadrant pain for 3 days for which GI is consulted.    #Abdominal pain   #Esophagitis/gastric wall thickening  GI consulted for abdominal pain which suspect is most likely MSK pain given location of pain and given no association with PO intake. She is noted to incidentally have gastric wall and esophageal wall thickening on imaging which are nonspecific and may represent underdistention. No urgent indication for endoscopic eval but can be pursued in future given imaging findings- would defer to outpatient.  Recommendations:  -PPI BID for now to assess if improvement in sxs  -Agree w/ cyclobenzaprine as suspect abdominal pain is MSK pain  -If clinically improving ok for d/c from GI perspective; EGD can be pursued in future for imaging findings but not urgent and can be done outpt. If remains in hospital over weekend, please call GI to possibly schedule while inpatient  -Rest per primary team  -Please call with questions  - Please provide patient with Gastroenterology Clinic number to confirm/arrange appointment; [phone] (Faculty Practice at [address] [facility]) or [phone] ([facility] at [address] [place] [address]) or [phone] ([facility] at [address] [facility]).     Note incomplete until finalized by attending signature/attestation.    Rosemary Nguyen  GI/Hepatology Fellow PGY5    NON-URGENT CONSULTS:  Please email [email or web address] OR [email or web address]  AT NIGHT AND ON WEEKENDS:  Available on Microsoft Teams  [phone] ([facility] Pager)    For urgent consults, please contact on call GI team. See Amion schedule ([facility]) or [employer] system ([facility])

## 2025-03-08 NOTE — PROGRESS NOTE ADULT - ASSESSMENT
35-year-old female with no significant past medical history presents with RUQ pain for 3 days. In the ED/CDU, trop and dimer neg, cxr clear. Pt continued to endorse SOB/ CP. EKG is normal and no structural heart disease noted on echo. Cardiology was consulted in CDU, per cardiology, atypical for angina and unlikely cardiac etiology. CTA chest - no pulmonary embolus. Mild esophageal wall thickening which may represent esophagitis. There is also apparent gastric wall thickening which may be related to underdistention or gastritis. CTAP No evidence of acute intra-abdominal or pelvic pathology. Plan to start PPI 40mg BID IV, Maalox, pepcid for gastritis. Pain control with Tylenol and breakthrough pain with Morphine as needed. Start muscle relaxant as pt states this helped her in the past. Monitor for thrombocytopenia. PRINCIPAL DISCHARGE DIAGNOSIS  Diagnosis: Atrial fibrillation  Assessment and Plan of Treatment:      35-year-old female with no significant past medical history presents with RUQ pain for 3 days. In the ED/CDU, trop and dimer neg, cxr clear. Pt continued to endorse SOB/ CP. EKG is normal and no structural heart disease noted on echo. Cardiology was consulted in CDU, per cardiology, atypical for angina and unlikely cardiac etiology. CTA chest - no pulmonary embolus. Mild esophageal wall thickening which may represent esophagitis. There is also apparent gastric wall thickening which may be related to underdistention or gastritis. CTAP No evidence of acute intra-abdominal or pelvic pathology. Plan to start PPI 40mg BID IV, Maalox, pepcid for gastritis. Pain control with Tylenol and breakthrough pain with Morphine as needed. Started muscle relaxant as pt states this helped her in the past. Monitor for thrombocytopenia. 35-year-old female with no significant past medical history presents with RUQ pain for 3 days. In the ED/CDU, trop and dimer neg, cxr clear. Pt continued to endorse SOB/ CP. EKG is normal and no structural heart disease noted on echo. Cardiology was consulted in CDU, per cardiology, atypical for angina and unlikely cardiac etiology. CTA chest - no pulmonary embolus. Mild esophageal wall thickening which may represent esophagitis. There is also apparent gastric wall thickening which may be related to underdistention or gastritis. CTAP No evidence of acute intra-abdominal or pelvic pathology. Plan to start PPI 40mg BID IV, Maalox, pepcid for gastritis. Pain control with Tylenol and breakthrough pain with Morphine as needed. Started muscle relaxant as pt states this helped her in the past. Monitor for thrombocytopenia. Patient still uncomfortable and with RUQ pain. GI consulted.

## 2025-03-08 NOTE — DISCHARGE NOTE PROVIDER - NSDCFUADDAPPT_GEN_ALL_CORE_FT
APPTS ARE READY TO BE MADE: [ ] YES    Best Family or Patient Contact (if needed):    Additional Information about above appointments (if needed):    1: PCP  2: GI  3:     Other comments or requests:

## 2025-03-08 NOTE — PROGRESS NOTE ADULT - SUBJECTIVE AND OBJECTIVE BOX
PROGRESS NOTE:   Authored by Dr. Ashley Espinoza MD (PGY-1).  via TEAMS    Patient is a 35y old  Female who presents with a chief complaint of chest/RUQ pain (07 Mar 2025 16:02)      SUBJECTIVE / OVERNIGHT EVENTS:  Hypotensive to 85/66 around 5am today, received 1L bolus and tyleno with slight improvement to 91/61.     ADDITIONAL REVIEW OF SYSTEMS:  Patient denies fevers, chills, shortness of breath, diarrhea, constipation, dysuria, leg swelling, headache, light headedness.    MEDICATIONS  (STANDING):  acetaminophen     Tablet .. 1000 milliGRAM(s) Oral every 6 hours  aluminum hydroxide/magnesium hydroxide/simethicone Suspension 30 milliLiter(s) Oral daily  buPROPion XL (24-Hour) . 150 milliGRAM(s) Oral daily  busPIRone 10 milliGRAM(s) Oral <User Schedule>  cetirizine 10 milliGRAM(s) Oral daily  cyclobenzaprine 5 milliGRAM(s) Oral every 8 hours  enoxaparin Injectable 40 milliGRAM(s) SubCutaneous every 24 hours  famotidine Injectable 20 milliGRAM(s) IV Push daily  fluticasone propionate 50 MICROgram(s)/spray Nasal Spray 1 Spray(s) Both Nostrils <User Schedule>  pantoprazole  Injectable 40 milliGRAM(s) IV Push two times a day    MEDICATIONS  (PRN):  ondansetron Injectable 4 milliGRAM(s) IV Push every 6 hours PRN Nausea and/or Vomiting      CAPILLARY BLOOD GLUCOSE        I&O's Summary      PHYSICAL EXAM:  Vital Signs Last 24 Hrs  T(C): 37.3 (08 Mar 2025 05:37), Max: 37.3 (08 Mar 2025 05:37)  T(F): 99.1 (08 Mar 2025 05:37), Max: 99.1 (08 Mar 2025 05:37)  HR: 57 (08 Mar 2025 06:49) (57 - 66)  BP: 91/61 (08 Mar 2025 06:49) (85/56 - 109/74)  BP(mean): --  RR: 16 (08 Mar 2025 06:49) (16 - 20)  SpO2: 98% (08 Mar 2025 06:49) (98% - 100%)    Parameters below as of 08 Mar 2025 06:49  Patient On (Oxygen Delivery Method): room air      GENERAL: NAD, conversant   HEAD:  Atraumatic, Normocephalic  EYES: EOMI, PERRLA, conjunctiva and sclera clear  ENT: Moist mucous membranes  NECK: Supple, No elevated JVP.  CHEST/LUNG: Clear to auscultation bilaterally; No rales, rhonchi, or wheezes.   HEART: Regular rate and rhythm; No murmurs, rubs, or gallops  ABDOMEN: Bowel sounds present; Soft, No TTP, nondistended  EXTREMITIES:  2+ Peripheral Pulses, brisk capillary refill. No clubbing, cyanosis, or edema  NERVOUS SYSTEM:  A&Ox3, no focal deficits   SKIN: No rashes or lesions    LABS:                        11.2   8.96  )-----------( 87       ( 07 Mar 2025 12:04 )             32.7     03-07    136  |  104  |  14  ----------------------------<  90  4.1   |  21[L]  |  0.36[L]    Ca    9.2      07 Mar 2025 12:04    TPro  6.9  /  Alb  3.7  /  TBili  0.3  /  DBili  x   /  AST  19  /  ALT  11  /  AlkPhos  54  03-07    PT/INR - ( 06 Mar 2025 10:56 )   PT: 10.7 sec;   INR: 0.92 ratio         PTT - ( 06 Mar 2025 10:56 )  PTT:36.6 sec      Urinalysis Basic - ( 07 Mar 2025 12:04 )    Color: x / Appearance: x / SG: x / pH: x  Gluc: 90 mg/dL / Ketone: x  / Bili: x / Urobili: x   Blood: x / Protein: x / Nitrite: x   Leuk Esterase: x / RBC: x / WBC x   Sq Epi: x / Non Sq Epi: x / Bacteria: x        Urinalysis with Rflx Culture (collected 07 Mar 2025 11:10)        Tele Reviewed:    RADIOLOGY & ADDITIONAL TESTS:  Results Reviewed:   Imaging Personally Reviewed:  Electrocardiogram Personally Reviewed:     PROGRESS NOTE:   Authored by Dr. Ashley Espinoza MD (PGY-1).  via TEAMS    Patient is a 35y old  Female who presents with a chief complaint of chest/RUQ pain (07 Mar 2025 16:02)      SUBJECTIVE / OVERNIGHT EVENTS:  Hypotensive to 85/66 around 5am today, received 1L bolus and tyleno with slight improvement to 91/61. Complains of RUQ pain - slightly improved from yesterday    ADDITIONAL REVIEW OF SYSTEMS:  Patient denies fevers, chills, shortness of breath, diarrhea, constipation, dysuria, leg swelling, headache, lightheadedness.    MEDICATIONS  (STANDING):  acetaminophen     Tablet .. 1000 milliGRAM(s) Oral every 6 hours  aluminum hydroxide/magnesium hydroxide/simethicone Suspension 30 milliLiter(s) Oral daily  buPROPion XL (24-Hour) . 150 milliGRAM(s) Oral daily  busPIRone 10 milliGRAM(s) Oral <User Schedule>  cetirizine 10 milliGRAM(s) Oral daily  cyclobenzaprine 5 milliGRAM(s) Oral every 8 hours  enoxaparin Injectable 40 milliGRAM(s) SubCutaneous every 24 hours  famotidine Injectable 20 milliGRAM(s) IV Push daily  fluticasone propionate 50 MICROgram(s)/spray Nasal Spray 1 Spray(s) Both Nostrils <User Schedule>  pantoprazole  Injectable 40 milliGRAM(s) IV Push two times a day    MEDICATIONS  (PRN):  ondansetron Injectable 4 milliGRAM(s) IV Push every 6 hours PRN Nausea and/or Vomiting      CAPILLARY BLOOD GLUCOSE        I&O's Summary      PHYSICAL EXAM:  Vital Signs Last 24 Hrs  T(C): 37.3 (08 Mar 2025 05:37), Max: 37.3 (08 Mar 2025 05:37)  T(F): 99.1 (08 Mar 2025 05:37), Max: 99.1 (08 Mar 2025 05:37)  HR: 57 (08 Mar 2025 06:49) (57 - 66)  BP: 91/61 (08 Mar 2025 06:49) (85/56 - 109/74)  BP(mean): --  RR: 16 (08 Mar 2025 06:49) (16 - 20)  SpO2: 98% (08 Mar 2025 06:49) (98% - 100%)    Parameters below as of 08 Mar 2025 06:49  Patient On (Oxygen Delivery Method): room air      GENERAL: uncomfortable  HEAD:  Atraumatic, Normocephalic  EYES: EOMI, PERRLA, conjunctiva and sclera clear  ENT: Moist mucous membranes  NECK: Supple, No elevated JVP.  CHEST/LUNG: Clear to auscultation bilaterally; No rales, rhonchi, or wheezes.   HEART: Regular rate and rhythm; No murmurs, rubs, or gallops  ABDOMEN: Bowel sounds present; Soft, mild TTP over RUQ, nondistended  EXTREMITIES:  2+ Peripheral Pulses, brisk capillary refill. No clubbing, cyanosis, or edema  NERVOUS SYSTEM:  A&Ox3, no focal deficits   SKIN: No rashes or lesions    LABS:                        11.2   8.96  )-----------( 87       ( 07 Mar 2025 12:04 )             32.7     03-07    136  |  104  |  14  ----------------------------<  90  4.1   |  21[L]  |  0.36[L]    Ca    9.2      07 Mar 2025 12:04    TPro  6.9  /  Alb  3.7  /  TBili  0.3  /  DBili  x   /  AST  19  /  ALT  11  /  AlkPhos  54  03-07    PT/INR - ( 06 Mar 2025 10:56 )   PT: 10.7 sec;   INR: 0.92 ratio         PTT - ( 06 Mar 2025 10:56 )  PTT:36.6 sec      Urinalysis Basic - ( 07 Mar 2025 12:04 )    Color: x / Appearance: x / SG: x / pH: x  Gluc: 90 mg/dL / Ketone: x  / Bili: x / Urobili: x   Blood: x / Protein: x / Nitrite: x   Leuk Esterase: x / RBC: x / WBC x   Sq Epi: x / Non Sq Epi: x / Bacteria: x        Urinalysis with Rflx Culture (collected 07 Mar 2025 11:10)        Tele Reviewed:    RADIOLOGY & ADDITIONAL TESTS:  Results Reviewed:   Imaging Personally Reviewed:  Electrocardiogram Personally Reviewed:

## 2025-03-08 NOTE — PROGRESS NOTE ADULT - ATTENDING COMMENTS
35F w/ PID, MDD, PTSD, seizure disorder, migraine presenting w/ right sided flank/abdominal pain w/ associated substernal CP    #Esophagitis/gastritis  -Cont pepcid, maalox  -Trial sulcralfate.  -Defer on further NSAIDs at this point. Will reassess response to above therapy.  -If no improvement - will call GI for further eval.    #Pleuritic pain  -Standing tylenol, lidocaine   -Avoid further NSAIDs.    #Thrombocytopenia: Chronic, stable, B12/folate all unremarkable  #MDD: continue wellbutrin and bupropion  #KELLEE: continue iron supplementation    Plan of care d/w Resident 35F w/ PID, MDD, PTSD, seizure disorder, migraine presenting w/ right sided flank/abdominal pain w/ associated substernal CP  Has ongoing discomfort of R flank; RUQ  UA negative    #Esophagitis/gastritis  -Cont pepcid, maalox  -Defer on further NSAIDs at this point. Will reassess response to above therapy.  -c/s GI for further eval.  #Pleuritic pain  -Standing tylenol, lidocaine   -Avoid further NSAIDs.  #Thrombocytopenia: Chronic, stable, B12/folate labs all unremarkable  #MDD: continue wellbutrin and bupropion  #KELLEE: continue iron supplementation    Plan of care d/w Resident

## 2025-03-08 NOTE — DISCHARGE NOTE PROVIDER - PROVIDER TOKENS
FREE:[LAST:[Dimas],FIRST:[Fawad],PHONE:[phone]],FAX:[(   )    -],FOLLOWUP:[2 weeks],ESTABLISHEDPATIENT:[T]]

## 2025-03-08 NOTE — CONSULT NOTE ADULT - SUBJECTIVE AND OBJECTIVE BOX
Cardiovascular Disease Initial Evaluation  DATE OF SERVICE: 03-07-25 @ 09:54    CHIEF COMPLAINT: Chest pain    HISTORY OF PRESENT ILLNESS:    This is a 35 year old woman with depression on Lexapro who presented to [facility] on 3/6/2025 with right-sided chest pain for the past 2 to 3 days, worsening last night.  She has felt these episodes of chest pain in the past, and was hospitalized in 2022 for "enlarged heart muscles."  She had flulike symptoms over the past week, including cough with mucus production.  The chest pain today is worse with lying down and better with sitting up.      Allergies  No Known Allergies        MEDICATIONS:        ketorolac   Injectable 30 milliGRAM(s) IV Push every 6 hours PRN            PAST MEDICAL & SURGICAL HISTORY:  History of depression      No significant past surgical history          FAMILY HISTORY:  No pertinent family history in first degree relatives        SOCIAL HISTORY:    The patient is a nonsmoker       REVIEW OF SYSTEMS:  See HPI, otherwise complete 14 point review of systems negative      PHYSICAL EXAM:  T(C): 36.3 (03-07-25 @ 09:25), Max: 36.8 (03-06-25 @ 21:21)  HR: 57 (03-07-25 @ 09:25) (56 - 82)  BP: 105/71 (03-07-25 @ 09:25) (99/66 - 115/87)  RR: 18 (03-07-25 @ 09:25) (16 - 18)  SpO2: 100% (03-07-25 @ 09:25) (98% - 100%)  Wt(kg): --  I&O's Summary      Appearance: No Acute Distress; resting comfortably  HEENT:  Normal oral mucosa, PERRL, EOMI	  Cardiovascular: Normal S1 S2, No JVD, No murmurs/rubs/gallops  Respiratory: Normal respiratory effort; Lungs clear to auscultation bilaterally  Gastrointestinal:  Soft, Non-tender, + BS	  Skin: No rashes, No ecchymoses, No cyanosis	  Neurologic: Non-focal; no weakness  Extremities: No clubbing, cyanosis or edema  Vascular: Peripheral pulses palpable 2+ bilaterally  Psychiatry: A & O x 3, Mood & affect appropriate    Laboratory Data:	 	    CBC Full  -  ( 06 Mar 2025 10:56 )  WBC Count : 10.35 K/uL  Hemoglobin : 12.7 g/dL  Hematocrit : 38.1 %  Platelet Count - Automated : 100 K/uL  Mean Cell Volume : 87.8 fL  Mean Cell Hemoglobin : 29.3 pg  Mean Cell Hemoglobin Concentration : 33.3 g/dL  Auto Neutrophil # : x  Auto Lymphocyte # : x  Auto Monocyte # : x  Auto Eosinophil # : x  Auto Basophil # : x  Auto Neutrophil % : x  Auto Lymphocyte % : x  Auto Monocyte % : x  Auto Eosinophil % : x  Auto Basophil % : x    03-06    137  |  107  |  15  ----------------------------<  103[H]  4.3   |  17[L]  |  0.46[L]  03-06    135  |  103  |  13  ----------------------------<  86  5.3   |  22  |  0.38[L]    Ca    8.9      06 Mar 2025 17:29  Ca    9.3      06 Mar 2025 10:56    TPro  7.0  /  Alb  3.8  /  TBili  0.3  /  DBili  x   /  AST  29  /  ALT  13  /  AlkPhos  53  03-06  TPro  8.5[H]  /  Alb  4.3  /  TBili  0.3  /  DBili  x   /  AST  39[H]  /  ALT  15  /  AlkPhos  59  03-06      Interpretation of Telemetry: Sinus	    ECG:  	Normal sinus rhythm      Assessment: 35 year old woman with depression presents with R sided chest pain.     Plan of Care:    #Chest pain-  Atypical for angina.  EKG is normal and no structural heart disease noted on echo.  Unlikely cardiac etiology.  Consider MSK causes.     Reassurance given to Ms. Mayers in the CDU.     46 minutes spent on total encounter; 100% of the visit was spent counseling and/or coordinating care by the attending physician.   	  Norman Molina MD [facility]  Cardiovascular Diseases  [phone]    
INITIAL GI CONSULTATION  HPI:  35-year-old with no significant past medical history presenting with right upper quadrant pain for 3 days for which GI is consulted.    She reports onset of right upper quadrant abdominal pain that started 3 days ago.  Pain is worse with deep breaths and with movement but is not worse with any p.o. intake.  She had similar pain a few years ago which was determined to be musculoskeletal pain and improved with Advil at [facility].  On arrival, hemodynamically stable with BPs 90s over 60s which appears to be normal for her.  Labs demonstrated a mild anemia to 11-12 with normal MCV, low platelets to the 80s.  She had a normal INR, normal BMP, and normal liver chemistries.  Her vitamin B12 was normal and lipase was 30.  UA was normal.  She had a CT chest angio chest showing no pulmonary embolism, mild esophageal wall thickening which may represent esophagitis also with gastric wall thickening which may be related to underdistention versus gastritis.  She had CT abdomen pelvis with IV contrast which showed no evidence of intra-abdominal or pelvic pathology and no focal abnormality of the liver.  She is status postcholecystectomy.  Bile duct measured 0.8 cm likely in the setting of postcholecystectomy state.  She also had ultrasound which showed CBD dilatation to 0.9 cm possibly related to postcholecystectomy state.  She had TTE showing EF 62%, trace mitral regurg.  Troponins and D-dimer were negative.  She was seen by cardiology and thought that pain unlikely related to cardiac etiology.  She was started on cyclobenzaprine today for possible MSK pain.    FamHx: ***no h/o GI malignancies known  PMH/PSH:  PAST MEDICAL & SURGICAL HISTORY:  History of depression      Post traumatic stress disorder (PTSD)      Migraine      Chronic thoracic back pain      Memory deficit      S/P cholecystectomy          MEDS:  MEDICATIONS  (STANDING):  acetaminophen     Tablet .. 1000 milliGRAM(s) Oral every 6 hours  aluminum hydroxide/magnesium hydroxide/simethicone Suspension 30 milliLiter(s) Oral daily  buPROPion XL (24-Hour) . 150 milliGRAM(s) Oral daily  busPIRone 10 milliGRAM(s) Oral <User Schedule>  cetirizine 10 milliGRAM(s) Oral daily  cyclobenzaprine 5 milliGRAM(s) Oral every 8 hours  enoxaparin Injectable 40 milliGRAM(s) SubCutaneous every 24 hours  famotidine Injectable 20 milliGRAM(s) IV Push daily  fluticasone propionate 50 MICROgram(s)/spray Nasal Spray 1 Spray(s) Both Nostrils <User Schedule>    MEDICATIONS  (PRN):  ondansetron Injectable 4 milliGRAM(s) IV Push every 6 hours PRN Nausea and/or Vomiting    Allergies    No Known Allergies    Intolerances          ______________________________________________________________________  PHYSICAL EXAM:  T(C): 36.4 (25 @ 17:10), Max: 37.3 (25 @ 05:37)  HR: 73 (25 @ 17:10)  BP: 103/73 (25 @ 17:10)  RR: 17 (25 @ 17:10)  SpO2: 100% (25 @ 17:10)  Wt(kg): --    GEN: NAD, normocephalic  CVS: Normal rate, HD stable  CHEST: No signs of respiratory distress, breathing comfortably, no accessory muscle usage  ABD: soft , tender in right lateral abdomen mostly over ribs.   EXTR: no cyanosis, no clubbing, no edema  NEURO: Awake and alert, conversant  SKIN:  warm;  non icteric          Labs/Imaging reviewed.                        10.1   6.69  )-----------( 80       ( 08 Mar 2025 06:55 )             29.1     Last Hb:Hemoglobin: 10.1 g/dL (25 @ 06:55)  Hemoglobin: 11.2 g/dL (25 @ 12:04)  Hemoglobin: 12.7 g/dL (25 @ 10:56)               140   |  108   |  16                 Ca: 8.7    BMP:   ----------------------------< 89     M.90  (25 @ 06:55)             4.1    |  20    | 0.46               Ph: 4.1      LFT:     TPro: 6.2 / Alb: 3.3 / TBili: 0.3 / DBili: x / AST: 20 / ALT: 13 / AlkPhos: 51   (25 @ 06:55)    Creatinine: 0.46 mg/dL  Creatinine: 0.36 mg/dL  Creatinine: 0.46 mg/dL      BUN/Cr: Blood Urea Nitrogen: 16 mg/dL (25 @ 06:55)  /Creatinine: 0.46 mg/dL (25 @ 06:55)    AST/ALTAspartate Aminotransferase (AST/SGOT): 20 U/L (25 @ 06:55)  /Alanine Aminotransferase (ALT/SGPT): 13 U/L (25 @ 06:55)    ALP Alkaline Phosphatase: 51 U/L (25 @ 06:55)    T/Dbili /  INR: INR: 0.92 ratio (25 @ 10:56)      EGD/[place]:      Imaging:  CT Abdomen and Pelvis w/ IV Cont:   ACC: [id number] EXAM:  CT ABDOMEN AND PELVIS IC   ORDERED BY: MONY STILES     PROCEDURE DATE:  2025          INTERPRETATION:  CLINICAL INFORMATION: Right upper quadrant pain.   Evaluate for hepatic infarct.    COMPARISON: None.    CONTRAST/COMPLICATIONS:  IV Contrast: Omnipaque 350  90 cc administered   10 cc discarded  Oral Contrast: NONE  .    PROCEDURE:  CT of the Abdomen and Pelvis was performed.  Sagittal and coronal reformats were performed.    FINDINGS:  LOWER CHEST: Within normal limits.    LIVER: Hepatomegaly versus Riedel's lobe, a normal variant, measuring 22   cm on the right in long axis. No focal liver lesion.  BILE DUCTS: Mild dilation of the common bile duct up to 0.8 cm likely   reflecting postcholecystectomy state.  GALLBLADDER: Cholecystectomy.  SPLEEN: Within normal limits.  PANCREAS: Within normal limits. Pancreas divisum.  ADRENALS: Within normal limits.  KIDNEYS/URETERS: Within normal limits.    BLADDER: Bladder is opacified with contrast from exam performed earlier.  REPRODUCTIVE ORGANS: Uterus within normal limits. Involuting left ovarian   corpus luteal cyst.    BOWEL: No bowel obstruction. Appendix is normal.  PERITONEUM/RETROPERITONEUM: Trace fluid in the pelvis, likely physiologic.  VESSELS: Within normal limits.  LYMPH NODES: No lymphadenopathy.  ABDOMINAL WALL: Within normal limits.  BONES: Within normal limits.    IMPRESSION:  No evidence of acute intra-abdominal or pelvic pathology. Specifically,   no focal abnormality of the liver.        --- End of Report ---

## 2025-03-08 NOTE — DISCHARGE NOTE PROVIDER - NSFOLLOWUPCLINICS_GEN_ALL_ED_FT
Gastroenterology at [facility]  Gastroenterology  [address] [place] [id number]  Phone: [phone]  Fax:   Follow Up Time: 2 weeks

## 2025-03-08 NOTE — DISCHARGE NOTE PROVIDER - NSDCCPTREATMENT_GEN_ALL_CORE_FT
PRINCIPAL PROCEDURE  Procedure: CT angiogram chest w contrast  Findings and Treatment: FINDINGS:  LUNGS AND LARGE AIRWAYS: Patent central airways. No pulmonary nodules.  PLEURA: No pleural effusion.  VESSELS: Within normal limits. No pulmonary embolism.  HEART: Heart size is normal. No pericardial effusion.  MEDIASTINUM AND ABDI: No lymphadenopathy. Mild esophageal wall thickening.  CHEST WALL AND LOWER NECK: Within normal limits.  VISUALIZED UPPER ABDOMEN: Cholecystectomy. Mild gastric wall thickening   which may be related to underdistention or gastritis.  BONES: Within normal limits.  IMPRESSION:  No pulmonary embolus.  Mild esophageal wall thickening which may represent esophagitis. There is   also apparent gastric wall thickening which may be related to   underdistention or gastritis. Correlate clinically.        SECONDARY PROCEDURE  Procedure: CT abdomen pelvis  Findings and Treatment:   FINDINGS:  LOWER CHEST: Within normal limits.  LIVER: Hepatomegaly versus Riedel's lobe, a normal variant, measuring 22   cm on the right in long axis. No focal liver lesion.  BILE DUCTS: Mild dilation of the common bile duct up to 0.8 cm likely   reflecting postcholecystectomy state.  GALLBLADDER: Cholecystectomy.  SPLEEN: Within normal limits.  PANCREAS: Within normal limits. Pancreas divisum.  ADRENALS: Within normal limits.  KIDNEYS/URETERS: Within normal limits.  BLADDER: Bladder is opacified with contrast from exam performed earlier.  REPRODUCTIVE ORGANS: Uterus within normal limits. Involuting left ovarian   corpus luteal cyst.  BOWEL: No bowel obstruction. Appendix is normal.  PERITONEUM/RETROPERITONEUM: Trace fluid in the pelvis, likely physiologic.  VESSELS: Within normal limits.  LYMPH NODES: No lymphadenopathy.  ABDOMINAL WALL: Within normal limits.  BONES: Within normal limits.  IMPRESSION:  No evidence of acute intra-abdominal or pelvic pathology. Specifically,   no focal abnormality of the liver.      Procedure: US abdomen limited  Findings and Treatment:   FINDINGS:  Patient experienced right upper quadrant pain during the examination, but   was not focally tender as per technologist.  Bile ducts: Common bile duct is dilated, 0.9 cm.  Gallbladder: Cholecystectomy.  Pancreas: Visualized portions are withinnormal limits.  IMPRESSION:  Common bile duct dilation, measuring up to 0.9 cm, possibly related to   postcholecystectomy state. No choledocholithiasis is identified.      Procedure: XR chest AP  Findings and Treatment: FINDINGS:  The lungs are equally aerated and free of focal consolidations.  The heart is not enlarged.  There are no effusions or pneumothorax.  There is no acute bone pathology.  COMPARISON: November 9, 2024  IMPRESSION: Clear lungs.      Procedure: Complete transthoracic echocardiography (TTE)  Findings and Treatment: CONCLUSIONS:      1. Left ventricular cavity is normal in size. Left ventricular wall thickness is normal. Left ventricular systolic function is normal with an ejection fraction of 62 % by 3D. There are no regional wall motion abnormalities seen.   2. Normal left ventricular diastolic function.   3. Left ventricular global longitudinal strain is -20.7 % which is normal (< -18%). Images were acquired on a MADS ultrasound system and processed on the ultrasound machine with a heart rate of 58 bpm and a blood pressure of 107/72 mmHg.   4. Normal right ventricular cavity size and normal right ventricular systolic function. Tricuspid annular plane systolic excursion (TAPSE) is 2.7 cm (normal >=1.7 cm).   5. Structurally normal mitral valve with normal leaflet excursion. There is trace mitral regurgitation.

## 2025-03-08 NOTE — CONSULT NOTE ADULT - ATTENDING COMMENTS
Patient seen/examined.  Assessment/recommendations as noted.  Dominant complaint is of pain localized across the right costal margin radiating to the flank and back.  Exacerbated by deep breath, cough, sneeze and exertion.  No relation to eating.  Appears somewhat uncomfortable.  Abdomen–soft/nontender/nondistended.  No chest wall tenderness elicited.  Location and character of pain suggestive of a musculoskeletal chest wall etiology as most likely cause of pain.  Equivocal findings on CT  terms of as far as possible mild esophageal wall thickening suspect unrelated.  Recommendations as noted–continue current regimen of pantoprazole.  Avoid NSAIDs if feasible.  Plan for outpatient GI follow-up regarding questionable finding of esophageal wall thickening on CT.

## 2025-03-08 NOTE — DISCHARGE NOTE PROVIDER - CARE PROVIDER_API CALL
Fawad Cochran  Phone: [phone]  Fax: (   )    -  Established Patient  Follow Up Time: 2 weeks

## 2025-03-08 NOTE — DISCHARGE NOTE PROVIDER - NSDCMRMEDTOKEN_GEN_ALL_CORE_FT
brexpiprazole 2 mg oral tablet: 1 tab(s) orally once a day  BuSpar 10 mg oral tablet: 1 tab(s) orally once a day (at bedtime)  D3 25 mcg (1000 intl units) oral tablet: 1 tab(s) orally once a day  Emgality Prefilled Pen 120 mg/mL subcutaneous solution: 120 milligram(s) subcutaneously every 30 days every 5th of the month  ferrous sulfate: 325 milligram(s) orally every other day  fluticasone 50 mcg/inh inhalation powder: 1 inhaled once a day  loratadine 10 mg oral capsule: 1 cap(s) orally once a day  naproxen 500 mg oral tablet: 1 tab(s) orally 2 times a day as needed for  pain  Pepcid 20 mg oral tablet: 1 tab(s) orally once a day  rimegepant 75 mg oral tablet, disintegratin tab(s) orally every other day  simethicone 80 mg oral tablet, chewable: 1 tab(s) chewed every 6 hours as needed for  gas  Wellbutrin  mg/24 hours oral tablet, extended release: 1 tab(s) orally once a day   brexpiprazole 2 mg oral tablet: 1 tab(s) orally once a day  BuSpar 10 mg oral tablet: 1 tab(s) orally once a day (at bedtime)  cyclobenzaprine 5 mg oral tablet: 1 tab(s) orally every 8 hours as needed for  abdominal pain  D3 25 mcg (1000 intl units) oral tablet: 1 tab(s) orally once a day  Emgality Prefilled Pen 120 mg/mL subcutaneous solution: 120 milligram(s) subcutaneously every 30 days every  of the month  ferrous sulfate: 325 milligram(s) orally every other day  fluticasone 50 mcg/inh inhalation powder: 1 inhaled once a day  loratadine 10 mg oral capsule: 1 cap(s) orally once a day  pantoprazole 40 mg oral delayed release tablet: 1 tab(s) orally 2 times a day  rimegepant 75 mg oral tablet, disintegratin tab(s) orally every other day  simethicone 80 mg oral tablet, chewable: 1 tab(s) chewed every 6 hours as needed for  gas  Wellbutrin  mg/24 hours oral tablet, extended release: 1 tab(s) orally once a day

## 2025-03-08 NOTE — DISCHARGE NOTE PROVIDER - NSDCCPCAREPLAN_GEN_ALL_CORE_FT
PRINCIPAL DISCHARGE DIAGNOSIS  Diagnosis: RUQ pain  Assessment and Plan of Treatment: You were evaluated for right upper abdominal pain. You had thorough imaging of the chest and abdomen which showed esophagitis and gastritis - inflammation of esophagus and stomach. You also underwent extensive cardiac exam with cardiology consult and workup did not show any concerning signs and cardiac cause of this pain is less likely. You were treated symptomatically with muscle relaxant and pain medication with *****improvement.   Gastroenterology was consulted ***  After discharge:  - Eat a healthy, balanced diet. Avoid foods that you suspect may have triggered or worsened your pain.  - For some people, fatty foods can exacerbate RUQ pain. You might consider limiting these initially and see if it helps.  - Stay hydrated by drinking plenty of water.  Please return to the emergency department if you develop fever, chills, chest pain, palpitation, shortness of breath, headache, dizziness, feel like fainting, nausea, vomiting, diarrhea, abdominal pain, inability to tolerate food, focal weakness, numbness or any other concerns.  Please follow up with your primary care doctor within 1-2 weeks of discharge for further management.      SECONDARY DISCHARGE DIAGNOSES  Diagnosis: Thrombocytopenia  Assessment and Plan of Treatment: Incidently, you were found to have low platelet count. Per your MyChart and yourself, this is chronic. There is no urgent need for treatment at this time. Please follow up with your primary care doctor within 1-2 weeks of discharge for further management.     PRINCIPAL DISCHARGE DIAGNOSIS  Diagnosis: RUQ pain  Assessment and Plan of Treatment: You were evaluated for right upper abdominal pain. You had thorough imaging of the chest and abdomen which showed esophagitis and gastritis - inflammation of esophagus and stomach. You also underwent extensive cardiac exam with cardiology consult and workup did not show any concerning signs and cardiac cause of this pain is less likely. You were treated symptomatically with muscle relaxant and pain medication with significant improvement.   Gastroenterology was consulted for the abdominal pain and recommended to continue with the muscle relaxant, stop the Naproxen, and swtich from pepcid to pantoprazole twice a day to reduce the acid in your stomch. They aslo recommended that you follow up with them after you leaving the hospital.   After discharge:  - Eat a healthy, balanced diet. Avoid foods that you suspect may have triggered or worsened your pain.  - For some people, fatty foods can exacerbate RUQ pain. You might consider limiting these initially and see if it helps.  - Stay hydrated by drinking plenty of water.  Please return to the emergency department if you develop fever, chills, chest pain, palpitation, shortness of breath, headache, dizziness, feel like fainting, nausea, vomiting, diarrhea, abdominal pain, inability to tolerate food, focal weakness, numbness or any other concerns.  Please follow up with your primary care doctor within 1-2 weeks of discharge for further management.      SECONDARY DISCHARGE DIAGNOSES  Diagnosis: Thrombocytopenia  Assessment and Plan of Treatment: Incidently, you were found to have low platelet count. Per your MyChart and yourself, this is chronic. There is no urgent need for treatment at this time. Please follow up with your primary care doctor within 1-2 weeks of discharge for further management.    Diagnosis: Gastric wall thickening  Assessment and Plan of Treatment: When you went for the CT scan of your abdomen it was noted that you has some gastric wall thickening. This is an non specific finding and should be followed up with your gastroenterologist. Please follow up with them in 1-2 weeks for further evaluation.

## 2025-03-08 NOTE — DISCHARGE NOTE PROVIDER - HOSPITAL COURSE
For full details, please see H&P, progress notes, consult notes and ancillary notes.      Hospital Course:  35-year-old female with no significant past medical history presents with RUQ pain for 3 days. In the ED/CDU, trop and dimer neg, cxr clear. Pt continued to endorse SOB/ CP. EKG is normal and no structural heart disease noted on echo. Cardiology was consulted in CDU, per cardiology, atypical for angina and unlikely cardiac etiology. CTA chest - no pulmonary embolus. Mild esophageal wall thickening which may represent esophagitis. There is also apparent gastric wall thickening which may be related to underdistention or gastritis. CTAP No evidence of acute intra-abdominal or pelvic pathology. Plan to start PPI 40mg BID IV, Maalox, pepcid for gastritis. Pain control with Tylenol and breakthrough pain with Morphine as needed. Started muscle relaxant as pt states this helped her in the past. Monitor for thrombocytopenia. Patient still uncomfortable and with RUQ pain. GI consulted.     On day of discharge, patient is clinically stable with no new exam findings or acute symptoms compared to prior. The patient was seen by the attending physician on the date of discharge and deemed stable and acceptable for discharge. The patient's chronic medical conditions were treated accordingly per the patient's home medication regimen. The patient's medication reconciliation (with changes made to chronic medications), follow up appointments, discharge orders, instructions, and significant lab and diagnostic studies are as noted.     Discharge follow up action items:     1. Follow up with PCP, GI  2. Follow up labs: n/a  3. Medication changes:    4. On hold medications:  5. Incidental findings:     Patient's ordered code status: full  Patient disposition: home    Patient will be discharged to home with close follow up. For full details, please see H&P, progress notes, consult notes and ancillary notes.      Hospital Course:  35-year-old female with no significant past medical history presents with RUQ pain for 3 days. In the ED/CDU, trop and dimer neg, cxr clear. Pt continued to endorse SOB/ CP. EKG is normal and no structural heart disease noted on echo. Cardiology was consulted in CDU, per cardiology, atypical for angina and unlikely cardiac etiology. CTA chest - no pulmonary embolus. Mild esophageal wall thickening which may represent esophagitis. There is also apparent gastric wall thickening which may be related to underdistention or gastritis. CTAP No evidence of acute intra-abdominal or pelvic pathology. Plan to start PPI 40mg BID IV, Maalox, pepcid for gastritis. Pain control with Tylenol and breakthrough pain with Morphine as needed. Started muscle relaxant as pt states this helped her in the past. Monitor for thrombocytopenia. Patient still uncomfortable and with RUQ pain. GI consulted.     On day of discharge, patient is clinically stable with no new exam findings or acute symptoms compared to prior. The patient was seen by the attending physician on the date of discharge and deemed stable and acceptable for discharge. The patient's chronic medical conditions were treated accordingly per the patient's home medication regimen. The patient's medication reconciliation (with changes made to chronic medications), follow up appointments, discharge orders, instructions, and significant lab and diagnostic studies are as noted.     Discharge follow up action items:     1. Follow up with PCP, GI  2. Follow up labs: n/a  3. Medication changes: No changes in Meds  4. On hold medications: None  5. Incidental findings: None    Patient's ordered code status: full  Patient disposition: home    Patient will be discharged to home with close follow up. For full details, please see H&P, progress notes, consult notes and ancillary notes.      Hospital Course:  35-year-old female with no significant past medical history presents with RUQ pain for 3 days. In the ED/CDU, trop and dimer neg, cxr clear. Pt continued to endorse SOB/ CP. EKG is normal and no structural heart disease noted on echo. Cardiology was consulted in CDU, per cardiology, atypical for angina and unlikely cardiac etiology. CTA chest - no pulmonary embolus. Mild esophageal wall thickening which may represent esophagitis. There is also apparent gastric wall thickening which may be related to underdistention or gastritis. CTAP No evidence of acute intra-abdominal or pelvic pathology. Plan to start PPI 40mg BID IV, Maalox, pepcid for gastritis. Pain control with Tylenol and breakthrough pain with Morphine as needed. Started muscle relaxant as pt states this helped her in the past. Monitor for thrombocytopenia. Patient still uncomfortable and with RUQ pain. GI consulted for abdominal pain and recommended a trial of a PPI BID, cyclobenzaprine 5mg q8h, and outpatient f/u.    On day of discharge, patient is clinically stable with no new exam findings or acute symptoms compared to prior. The patient was seen by the attending physician on the date of discharge and deemed stable and acceptable for discharge. The patient's chronic medical conditions were treated accordingly per the patient's home medication regimen. The patient's medication reconciliation (with changes made to chronic medications), follow up appointments, discharge orders, instructions, and significant lab and diagnostic studies are as noted.     Discharge follow up action items:     1. Follow up with PCP, GI  2. Follow up labs: n/a  3. Medication changes: Stop Naproxen and Pepcid. Start Cyclobenzaprine 5mg q8h PRN and Pantoprazole 40mg BID  4. On hold medications: None  5. Incidental findings: None    Patient's ordered code status: full  Patient disposition: home    Patient will be discharged to home with close follow up.

## 2025-03-08 NOTE — PROGRESS NOTE ADULT - SUBJECTIVE AND OBJECTIVE BOX
Cardiovascular Disease Progress Note  Date of service: 03-08-25 @ 19:15    Overnight events: No acute events overnight.  Patient is in no distress. She continues to have R sided oblique pain under her breast.   Otherwise review of systems negative    Objective Findings:  T(C): 36.4 (03-08-25 @ 17:10), Max: 37.3 (03-08-25 @ 05:37)  HR: 73 (03-08-25 @ 17:10) (57 - 73)  BP: 103/73 (03-08-25 @ 17:10) (85/56 - 103/73)  RR: 17 (03-08-25 @ 17:10) (16 - 18)  SpO2: 100% (03-08-25 @ 17:10) (98% - 100%)  Wt(kg): --  Daily Height in cm: 165.1 (07 Mar 2025 21:14)    Daily       Physical Exam:  Gen: NAD; Patient resting comfortably  HEENT: EOMI, Normocephalic/ atraumatic  CV: RRR, normal S1 + S2, no m/r/g  Lungs:  Normal respiratory effort; clear to auscultation bilaterally  Abd: soft, non-tender; bowel sounds present  Ext: No edema; warm and well perfused    Telemetry: n/a    Laboratory Data:                        10.1   6.69  )-----------( 80       ( 08 Mar 2025 06:55 )             29.1     03-08    140  |  108[H]  |  16  ----------------------------<  89  4.1   |  20[L]  |  0.46[L]    Ca    8.7      08 Mar 2025 06:55  Phos  4.1     03-08  Mg     1.90     03-08    TPro  6.2  /  Alb  3.3  /  TBili  0.3  /  DBili  x   /  AST  20  /  ALT  13  /  AlkPhos  51  03-08      CARDIAC MARKERS ( 08 Mar 2025 06:00 )  x     / x     / x     / x     / <1.0 ng/mL          Inpatient Medications:  MEDICATIONS  (STANDING):  acetaminophen     Tablet .. 1000 milliGRAM(s) Oral every 6 hours  aluminum hydroxide/magnesium hydroxide/simethicone Suspension 30 milliLiter(s) Oral daily  buPROPion XL (24-Hour) . 150 milliGRAM(s) Oral daily  busPIRone 10 milliGRAM(s) Oral <User Schedule>  cetirizine 10 milliGRAM(s) Oral daily  cyclobenzaprine 5 milliGRAM(s) Oral every 8 hours  enoxaparin Injectable 40 milliGRAM(s) SubCutaneous every 24 hours  fluticasone propionate 50 MICROgram(s)/spray Nasal Spray 1 Spray(s) Both Nostrils <User Schedule>  pantoprazole    Tablet 40 milliGRAM(s) Oral two times a day      Assessment: 35 year old woman with depression presents with R sided chest pain.     Plan of Care:    #Chest pain-  NON-cardiac chest pain  Pain occurred after a coughing spell   Likely musculoskeletal. Supportive care.   EKG is normal and no structural heart disease noted on echo.    Reassurance given to Ms. Mayers in the CDU    #ACP (advance care planning)-  Advanced care planning was discussed with the patient.  Risks, benefits and alternatives of medical treatment and procedures were discussed in detail and all questions were answered. 30 additional minutes spent addressing advance care plans.    Over 55 minutes spent on total encounter; more than 50% of the visit was spent counseling and/or coordinating care by the attending physician.      Emerson Molina DO [facility]  Cardiovascular Disease  [phone]

## 2025-03-09 VITALS
HEART RATE: 61 BPM | TEMPERATURE: 98 F | DIASTOLIC BLOOD PRESSURE: 66 MMHG | SYSTOLIC BLOOD PRESSURE: 100 MMHG | RESPIRATION RATE: 16 BRPM | OXYGEN SATURATION: 100 %

## 2025-03-09 LAB
ALBUMIN SERPL ELPH-MCNC: 3.2 G/DL — LOW (ref 3.3–5)
ALP SERPL-CCNC: 48 U/L — SIGNIFICANT CHANGE UP (ref 40–120)
ALT FLD-CCNC: 12 U/L — SIGNIFICANT CHANGE UP (ref 4–33)
ANION GAP SERPL CALC-SCNC: 12 MMOL/L — SIGNIFICANT CHANGE UP (ref 7–14)
AST SERPL-CCNC: 18 U/L — SIGNIFICANT CHANGE UP (ref 4–32)
BASOPHILS # BLD AUTO: 0.03 K/UL — SIGNIFICANT CHANGE UP (ref 0–0.2)
BASOPHILS NFR BLD AUTO: 0.5 % — SIGNIFICANT CHANGE UP (ref 0–2)
BILIRUB SERPL-MCNC: <0.2 MG/DL — SIGNIFICANT CHANGE UP (ref 0.2–1.2)
BUN SERPL-MCNC: 12 MG/DL — SIGNIFICANT CHANGE UP (ref 7–23)
CALCIUM SERPL-MCNC: 8.5 MG/DL — SIGNIFICANT CHANGE UP (ref 8.4–10.5)
CHLORIDE SERPL-SCNC: 108 MMOL/L — HIGH (ref 98–107)
CO2 SERPL-SCNC: 19 MMOL/L — LOW (ref 22–31)
CREAT SERPL-MCNC: 0.46 MG/DL — LOW (ref 0.5–1.3)
EGFR: 129 ML/MIN/1.73M2 — SIGNIFICANT CHANGE UP
EGFR: 129 ML/MIN/1.73M2 — SIGNIFICANT CHANGE UP
EOSINOPHIL # BLD AUTO: 0.12 K/UL — SIGNIFICANT CHANGE UP (ref 0–0.5)
EOSINOPHIL NFR BLD AUTO: 1.9 % — SIGNIFICANT CHANGE UP (ref 0–6)
GLUCOSE SERPL-MCNC: 107 MG/DL — HIGH (ref 70–99)
HCT VFR BLD CALC: 29.6 % — LOW (ref 34.5–45)
HGB BLD-MCNC: 10.1 G/DL — LOW (ref 11.5–15.5)
IANC: 3.7 K/UL — SIGNIFICANT CHANGE UP (ref 1.8–7.4)
IMM GRANULOCYTES NFR BLD AUTO: 0.3 % — SIGNIFICANT CHANGE UP (ref 0–0.9)
LYMPHOCYTES # BLD AUTO: 1.97 K/UL — SIGNIFICANT CHANGE UP (ref 1–3.3)
LYMPHOCYTES # BLD AUTO: 30.9 % — SIGNIFICANT CHANGE UP (ref 13–44)
MAGNESIUM SERPL-MCNC: 1.9 MG/DL — SIGNIFICANT CHANGE UP (ref 1.6–2.6)
MCHC RBC-ENTMCNC: 29.4 PG — SIGNIFICANT CHANGE UP (ref 27–34)
MCHC RBC-ENTMCNC: 34.1 G/DL — SIGNIFICANT CHANGE UP (ref 32–36)
MCV RBC AUTO: 86.3 FL — SIGNIFICANT CHANGE UP (ref 80–100)
MONOCYTES # BLD AUTO: 0.54 K/UL — SIGNIFICANT CHANGE UP (ref 0–0.9)
MONOCYTES NFR BLD AUTO: 8.5 % — SIGNIFICANT CHANGE UP (ref 2–14)
NEUTROPHILS # BLD AUTO: 3.7 K/UL — SIGNIFICANT CHANGE UP (ref 1.8–7.4)
NEUTROPHILS NFR BLD AUTO: 57.9 % — SIGNIFICANT CHANGE UP (ref 43–77)
NRBC # BLD AUTO: 0 K/UL — SIGNIFICANT CHANGE UP (ref 0–0)
NRBC # FLD: 0 K/UL — SIGNIFICANT CHANGE UP (ref 0–0)
NRBC BLD AUTO-RTO: 0 /100 WBCS — SIGNIFICANT CHANGE UP (ref 0–0)
PHOSPHATE SERPL-MCNC: 3.6 MG/DL — SIGNIFICANT CHANGE UP (ref 2.5–4.5)
PLATELET # BLD AUTO: 82 K/UL — LOW (ref 150–400)
POTASSIUM SERPL-MCNC: 3.8 MMOL/L — SIGNIFICANT CHANGE UP (ref 3.5–5.3)
POTASSIUM SERPL-SCNC: 3.8 MMOL/L — SIGNIFICANT CHANGE UP (ref 3.5–5.3)
PROT SERPL-MCNC: 6 G/DL — SIGNIFICANT CHANGE UP (ref 6–8.3)
RBC # BLD: 3.43 M/UL — LOW (ref 3.8–5.2)
RBC # FLD: 13 % — SIGNIFICANT CHANGE UP (ref 10.3–14.5)
SODIUM SERPL-SCNC: 139 MMOL/L — SIGNIFICANT CHANGE UP (ref 135–145)
WBC # BLD: 6.38 K/UL — SIGNIFICANT CHANGE UP (ref 3.8–10.5)
WBC # FLD AUTO: 6.38 K/UL — SIGNIFICANT CHANGE UP (ref 3.8–10.5)

## 2025-03-09 PROCEDURE — 99239 HOSP IP/OBS DSCHRG MGMT >30: CPT | Mod: GC

## 2025-03-09 RX ORDER — NAPROXEN SODIUM 275 MG
1 TABLET ORAL
Refills: 0 | DISCHARGE

## 2025-03-09 RX ORDER — CYCLOBENZAPRINE HYDROCHLORIDE 15 MG/1
1 CAPSULE, EXTENDED RELEASE ORAL
Qty: 9 | Refills: 0
Start: 2025-03-09

## 2025-03-09 RX ADMIN — Medication 1000 MILLIGRAM(S): at 05:38

## 2025-03-09 RX ADMIN — Medication 40 MILLIGRAM(S): at 05:42

## 2025-03-09 RX ADMIN — Medication 1000 MILLIGRAM(S): at 00:18

## 2025-03-09 RX ADMIN — SODIUM CHLORIDE 125 MILLILITER(S): 9 INJECTION, SOLUTION INTRAVENOUS at 05:38

## 2025-03-09 RX ADMIN — Medication 1000 MILLIGRAM(S): at 05:25

## 2025-03-09 RX ADMIN — CYCLOBENZAPRINE HYDROCHLORIDE 5 MILLIGRAM(S): 15 CAPSULE, EXTENDED RELEASE ORAL at 05:39

## 2025-03-09 NOTE — PROGRESS NOTE ADULT - PROBLEM SELECTOR PLAN 4
- platelet of 87 on 3/7  - Was in 100s 2/2025 during her opt office visit  - check vitamin B12, folate, HIV, HCV screen  - Defer on other autoimune workup.  - Review potential medication culprit.  - continue to trend
- platelet of 87 on 3/7  - Was in 100s 2/2025 during her opt office visit  - check vitamin B12, folate, HIV, HCV screen  - Defer on other autoimune workup.  - Review potential medication culprit.  - continue to trend

## 2025-03-09 NOTE — DISCHARGE NOTE NURSING/CASE MANAGEMENT/SOCIAL WORK - PATIENT PORTAL LINK FT
You can access the FollowMyHealth Patient Portal offered by [facility] by registering at the following website: [email or web address]. By joining [employer]’s FollowMyHealth portal, you will also be able to view your health information using other applications (apps) compatible with our system.

## 2025-03-09 NOTE — PROVIDER CONTACT NOTE (OTHER) - BACKGROUND
Patient admitted with chest pain. Hx of memory deficit, migraine, PTSD
Patient admitted with chest pain. Hx of memory deficit, migrane, PTSD
Patient admitted with chest pain. Hx of memory deficit, migraine, PTSD

## 2025-03-09 NOTE — PROVIDER CONTACT NOTE (OTHER) - DATE AND TIME:
08-Mar-2025 05:10
08-Mar-2025 01:22
08-Mar-2025 06:50
08-Mar-2025 21:19
07-Mar-2025 22:12
09-Mar-2025 05:32

## 2025-03-09 NOTE — PROGRESS NOTE ADULT - PROBLEM SELECTOR PLAN 5
DVT ppx: Lovenox  Diet: DASH/TLC  Dispo: pending  Code: full
DVT ppx: Lovenox  Diet: DASH/TLC  Dispo: pending  Code: full

## 2025-03-09 NOTE — PROGRESS NOTE ADULT - PROBLEM SELECTOR PLAN 2
- c/w home med: bupropion and wellbutrin  - Currently without depressive symptoms
- c/w home med: bupropion and wellbutrin  - Currently without depressive symptoms

## 2025-03-09 NOTE — PROGRESS NOTE ADULT - ATTENDING COMMENTS
35minutes on discharge plan of care including reviewing plan with patient, resident and instructions.

## 2025-03-09 NOTE — DISCHARGE NOTE NURSING/CASE MANAGEMENT/SOCIAL WORK - FINANCIAL ASSISTANCE
[facility] provides services at a reduced cost to those who are determined to be eligible through [facility]’s financial assistance program. Information regarding [facility]’s financial assistance program can be found by going to [email or web address] or by calling [phone].

## 2025-03-09 NOTE — DISCHARGE NOTE NURSING/CASE MANAGEMENT/SOCIAL WORK - NSDCPEFALRISK_GEN_ALL_CORE
For information on Fall & Injury Prevention, visit: [email or web address] OR  [email or web address] OR  https://www.cdc.gov/steadi/patient.html

## 2025-03-09 NOTE — PROVIDER CONTACT NOTE (OTHER) - RECOMMENDATIONS
López Valencia notified

## 2025-03-09 NOTE — PROGRESS NOTE ADULT - SUBJECTIVE AND OBJECTIVE BOX
Patient is a 35y old  Female who presents with a chief complaint of chest/RUQ pain (08 Mar 2025 19:15)     INTERVAL HPI/OVERNIGHT EVENTS:  - No acute events overnight    SUBJECTIVE  - Patient seen and evaluated at bedside. No acute complaints at this time.     MEDICATIONS  (STANDING):  acetaminophen     Tablet .. 1000 milliGRAM(s) Oral every 6 hours  aluminum hydroxide/magnesium hydroxide/simethicone Suspension 30 milliLiter(s) Oral daily  buPROPion XL (24-Hour) . 150 milliGRAM(s) Oral daily  busPIRone 10 milliGRAM(s) Oral <User Schedule>  cetirizine 10 milliGRAM(s) Oral daily  cyclobenzaprine 5 milliGRAM(s) Oral every 8 hours  enoxaparin Injectable 40 milliGRAM(s) SubCutaneous every 24 hours  fluticasone propionate 50 MICROgram(s)/spray Nasal Spray 1 Spray(s) Both Nostrils <User Schedule>  lactated ringers. 1000 milliLiter(s) (125 mL/Hr) IV Continuous <Continuous>  pantoprazole    Tablet 40 milliGRAM(s) Oral two times a day    MEDICATIONS  (PRN):  ondansetron Injectable 4 milliGRAM(s) IV Push every 6 hours PRN Nausea and/or Vomiting        REVIEW OF SYSTEMS: As indicated above; otherwise, negative    VITAL SIGNS:  T(F): 98 (03-09-25 @ 05:26), Max: 99 (03-08-25 @ 12:15)  HR: 65 (03-09-25 @ 05:26) (62 - 88)  BP: 92/53 (03-09-25 @ 05:26) (85/55 - 103/73)  RR: 17 (03-09-25 @ 05:26) (16 - 17)  SpO2: 100% (03-09-25 @ 05:26) (98% - 100%)    PHYSICAL EXAM:  General: NAD, well-groomed, well-developed  Eyes: Conjunctiva and sclera clear  ENMT: Moist mucous membranes  Neck: Supple, no tender lymphadenopathy   Chest: Clear to auscultation bilaterally; no rales, rhonchi, or wheezing  Heart: Regular rate and rhythm; normal S1 and S2; no murmurs, rubs, or gallops  Abdomen: Soft, nontender, nondistended, normoactive bowel sounds   Extremities: Warm, well perfused, no lower extremity edema   Nervous System: AOx3, no focal neurological deficits   Psych: Appropriate affect    LABS:                        10.1   6.38  )-----------( x        ( 09 Mar 2025 07:00 )             29.6       Ca    8.7        08 Mar 2025 06:55      CAPILLARY BLOOD GLUCOSE        BLOOD CULTURE    RADIOLOGY & ADDITIONAL TESTS:    Imaging Personally Reviewed:  [X ] YES     Consultant(s) Notes Reviewed:  Yes    Care Discussed with Consultants/Other Providers: Yes

## 2025-03-09 NOTE — PROGRESS NOTE ADULT - ASSESSMENT
35-year-old female with no significant past medical history presents with RUQ pain for 3 days. In the ED/CDU, trop and dimer neg, cxr clear. Pt continued to endorse SOB/ CP. EKG is normal and no structural heart disease noted on echo. Cardiology was consulted in CDU, per cardiology, atypical for angina and unlikely cardiac etiology. CTA chest - no pulmonary embolus. Mild esophageal wall thickening which may represent esophagitis. There is also apparent gastric wall thickening which may be related to underdistention or gastritis. CTAP No evidence of acute intra-abdominal or pelvic pathology. Plan to start PPI 40mg BID IV, Maalox, pepcid for gastritis. Pain control with Tylenol and breakthrough pain with Morphine as needed. Started muscle relaxant as pt states this helped her in the past. Monitor for thrombocytopenia. Patient still uncomfortable and with RUQ pain. GI consulted.

## 2025-03-09 NOTE — PROGRESS NOTE ADULT - SUBJECTIVE AND OBJECTIVE BOX
1Cardiovascular Disease Progress Note  DATE OF SERVICE: 03-09-25 @ 10:01    Overnight events: No acute events overnight.    The patient is resting comfortably.   Otherwise review of systems negative    Objective Findings:  T(C): 36.7 (03-09-25 @ 05:26), Max: 37.2 (03-08-25 @ 12:15)  HR: 65 (03-09-25 @ 05:26) (62 - 88)  BP: 92/53 (03-09-25 @ 05:26) (85/55 - 103/73)  RR: 17 (03-09-25 @ 05:26) (16 - 17)  SpO2: 100% (03-09-25 @ 05:26) (98% - 100%)  Wt(kg): --  Daily     Daily       Physical Exam:  Gen: NAD; Patient resting comfortably  HEENT: EOMI, Normocephalic/ atraumatic  CV: RRR, normal S1 + S2, no m/r/g  Lungs:  Normal respiratory effort; clear to auscultation bilaterally  Abd: soft, non-tender; bowel sounds present  Ext: No edema; warm and well perfused    Telemetry: n/a    Laboratory Data:                        10.1   6.38  )-----------( 82       ( 09 Mar 2025 07:00 )             29.6     03-09    139  |  108[H]  |  12  ----------------------------<  107[H]  3.8   |  19[L]  |  0.46[L]    Ca    8.5      09 Mar 2025 07:00  Phos  3.6     03-09  Mg     1.90     03-09    TPro  6.0  /  Alb  3.2[L]  /  TBili  <0.2  /  DBili  x   /  AST  18  /  ALT  12  /  AlkPhos  48  03-09      CARDIAC MARKERS ( 08 Mar 2025 06:00 )  x     / x     / x     / x     / <1.0 ng/mL          Inpatient Medications:  MEDICATIONS  (STANDING):  acetaminophen     Tablet .. 1000 milliGRAM(s) Oral every 6 hours  aluminum hydroxide/magnesium hydroxide/simethicone Suspension 30 milliLiter(s) Oral daily  buPROPion XL (24-Hour) . 150 milliGRAM(s) Oral daily  busPIRone 10 milliGRAM(s) Oral <User Schedule>  cetirizine 10 milliGRAM(s) Oral daily  cyclobenzaprine 5 milliGRAM(s) Oral every 8 hours  enoxaparin Injectable 40 milliGRAM(s) SubCutaneous every 24 hours  fluticasone propionate 50 MICROgram(s)/spray Nasal Spray 1 Spray(s) Both Nostrils <User Schedule>  lactated ringers. 1000 milliLiter(s) (125 mL/Hr) IV Continuous <Continuous>  pantoprazole    Tablet 40 milliGRAM(s) Oral two times a day      Assessment: 35 year old woman with depression presents with R sided chest pain.     Plan of Care:    #Chest pain-  EKG is normal and no structural heart disease noted on echo.   Likely musculoskeletal. Supportive care.   Cardiac reassurance provided.    #Hypotension-  Normal LV systolic function.  Unlikely cardiac etiology.          Over 55 minutes spent on total encounter; 100% of the visit was spent counseling and/or coordinating care by the attending physician.      Norman Molina MD [facility]  Cardiovascular Disease  [phone]

## 2025-03-09 NOTE — PROGRESS NOTE ADULT - PROBLEM SELECTOR PLAN 1
- atypical chest/RUQ pain; similar episode 5 years ago  - EKG wnl  - TTE EF 62%, trace MR  - neg trop and d-dimer  - Cardiology was consulted in CDU, per cardiology, atypical for angina and unlikely cardiac etiology.   - CTA chest - no pulmonary embolus. Mild esophageal wall thickening which may represent esophagitis. There is also apparent gastric wall thickening which may be related to underdistention or gastritis.   - CTAP: No evidence of acute intra-abdominal or pelvic pathology.   - received 30mg x 3 Toradol prior to admission  - s/p Maalox, pepcid, PPI IV    Plan:  - Start Cyclobenzaprine 5mg q8  - c/w Pepcid daily, Maalox daily  - Zofran PRN  - Pain control with Tylenol and Lidocaine patch  - GI consulted, recommend outpatient f/u  - Avoid NSAIDS

## 2025-03-10 LAB
HCV AB S/CO SERPL IA: 0.13 S/CO — SIGNIFICANT CHANGE UP (ref 0–0.79)
HCV AB SERPL-IMP: SIGNIFICANT CHANGE UP

## 2025-03-11 LAB — PYRIDOXAL PHOS SERPL-MCNC: 9.6 UG/L — SIGNIFICANT CHANGE UP (ref 3.4–65.2)
